# Patient Record
Sex: FEMALE | Race: WHITE | NOT HISPANIC OR LATINO | Employment: PART TIME | URBAN - METROPOLITAN AREA
[De-identification: names, ages, dates, MRNs, and addresses within clinical notes are randomized per-mention and may not be internally consistent; named-entity substitution may affect disease eponyms.]

---

## 2024-06-27 ENCOUNTER — APPOINTMENT (EMERGENCY)
Dept: RADIOLOGY | Facility: HOSPITAL | Age: 69
End: 2024-06-27
Payer: COMMERCIAL

## 2024-06-27 ENCOUNTER — HOSPITAL ENCOUNTER (EMERGENCY)
Facility: HOSPITAL | Age: 69
Discharge: HOME | End: 2024-06-27
Attending: EMERGENCY MEDICINE
Payer: COMMERCIAL

## 2024-06-27 VITALS
OXYGEN SATURATION: 98 % | TEMPERATURE: 98.7 F | DIASTOLIC BLOOD PRESSURE: 85 MMHG | SYSTOLIC BLOOD PRESSURE: 196 MMHG | HEART RATE: 86 BPM | RESPIRATION RATE: 18 BRPM

## 2024-06-27 DIAGNOSIS — S01.01XA LACERATION OF SCALP, INITIAL ENCOUNTER: ICD-10-CM

## 2024-06-27 DIAGNOSIS — M43.9 COMPRESSION DEFORMITY OF VERTEBRA: ICD-10-CM

## 2024-06-27 DIAGNOSIS — R91.1 PULMONARY NODULE: Primary | ICD-10-CM

## 2024-06-27 DIAGNOSIS — R03.0 ELEVATED BLOOD PRESSURE READING: ICD-10-CM
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
## 2024-06-27 DIAGNOSIS — S92.352A DISPLACED FRACTURE OF FIFTH METATARSAL BONE, LEFT FOOT, INITIAL ENCOUNTER FOR CLOSED FRACTURE: ICD-10-CM

## 2024-06-27 PROBLEM — S22.000A THORACIC COMPRESSION FRACTURE (HCC): Status: ACTIVE | Noted: 2024-06-27

## 2024-06-27 LAB
DME PARACHUTE DELIVERY DATE REQUESTED: NORMAL
DME PARACHUTE ITEM DESCRIPTION: NORMAL
DME PARACHUTE ORDER STATUS: NORMAL
DME PARACHUTE SUPPLIER NAME: NORMAL
DME PARACHUTE SUPPLIER PHONE: NORMAL

## 2024-06-27 PROCEDURE — 74176 CT ABD & PELVIS W/O CONTRAST: CPT

## 2024-06-27 PROCEDURE — 97167 OT EVAL HIGH COMPLEX 60 MIN: CPT

## 2024-06-27 PROCEDURE — NC001 PR NO CHARGE: Performed by: PODIATRIST

## 2024-06-27 PROCEDURE — 73130 X-RAY EXAM OF HAND: CPT

## 2024-06-27 PROCEDURE — 71250 CT THORAX DX C-: CPT

## 2024-06-27 PROCEDURE — 73630 X-RAY EXAM OF FOOT: CPT

## 2024-06-27 PROCEDURE — 97163 PT EVAL HIGH COMPLEX 45 MIN: CPT

## 2024-06-27 PROCEDURE — 70450 CT HEAD/BRAIN W/O DYE: CPT

## 2024-06-27 PROCEDURE — 72125 CT NECK SPINE W/O DYE: CPT

## 2024-06-27 PROCEDURE — 72080 X-RAY EXAM THORACOLMB 2/> VW: CPT

## 2024-06-27 PROCEDURE — NC001 PR NO CHARGE: Performed by: STUDENT IN AN ORGANIZED HEALTH CARE EDUCATION/TRAINING PROGRAM

## 2024-06-27 PROCEDURE — 99205 OFFICE O/P NEW HI 60 MIN: CPT | Performed by: STUDENT IN AN ORGANIZED HEALTH CARE EDUCATION/TRAINING PROGRAM

## 2024-06-27 PROCEDURE — NC001 PR NO CHARGE: Performed by: EMERGENCY MEDICINE

## 2024-06-27 PROCEDURE — 90715 TDAP VACCINE 7 YRS/> IM: CPT

## 2024-06-27 PROCEDURE — 90471 IMMUNIZATION ADMIN: CPT

## 2024-06-27 PROCEDURE — 12004 RPR S/N/AX/GEN/TRK7.6-12.5CM: CPT | Performed by: EMERGENCY MEDICINE

## 2024-06-27 PROCEDURE — 76705 ECHO EXAM OF ABDOMEN: CPT | Performed by: EMERGENCY MEDICINE

## 2024-06-27 PROCEDURE — 99285 EMERGENCY DEPT VISIT HI MDM: CPT | Performed by: EMERGENCY MEDICINE

## 2024-06-27 PROCEDURE — 99284 EMERGENCY DEPT VISIT MOD MDM: CPT

## 2024-06-27 PROCEDURE — 99214 OFFICE O/P EST MOD 30 MIN: CPT | Performed by: STUDENT IN AN ORGANIZED HEALTH CARE EDUCATION/TRAINING PROGRAM

## 2024-06-27 PROCEDURE — 76604 US EXAM CHEST: CPT | Performed by: EMERGENCY MEDICINE

## 2024-06-27 RX ORDER — LIDOCAINE HYDROCHLORIDE AND EPINEPHRINE 10; 10 MG/ML; UG/ML
10 INJECTION, SOLUTION INFILTRATION; PERINEURAL ONCE
Status: COMPLETED | OUTPATIENT
Start: 2024-06-27 | End: 2024-06-27

## 2024-06-27 RX ADMIN — LIDOCAINE HYDROCHLORIDE,EPINEPHRINE BITARTRATE 10 ML: 10; .01 INJECTION, SOLUTION INFILTRATION; PERINEURAL at 06:05

## 2024-06-27 RX ADMIN — TETANUS TOXOID, REDUCED DIPHTHERIA TOXOID AND ACELLULAR PERTUSSIS VACCINE, ADSORBED 0.5 ML: 5; 2.5; 8; 8; 2.5 SUSPENSION INTRAMUSCULAR at 06:05

## 2024-06-27 NOTE — CONSULTS
[facility]  Consult  Name: Melinda Hickman 68 y.o. female I MRN: [id number]  Unit/Bed#: ED 02 I Date of Admission: 2024   Date of Service: 2024 I Hospital Day: 0    Inpatient consult to Neurosurgery  Consult performed by: Anali Tafoya PA-C  Consult ordered by: Ashu Vargas MD        Assessment & Plan   * Thoracic compression fracture (HCC)  Assessment & Plan  Age indeterminate T5 and T7 compression fractures   Presented to the [facility] ER on  after a fall down 21 steps   + head strike, no LOC, was able to get up on her own   Denies any back pain, leg pain, weakness, N/T, trouble walking, urinary retention, BBI, etc.     Imagin/27 CT c/a/p: Moderate compression deformities of the T5 and T7 vertebral bodies of indeterminate age. Spinal degenerative changes.    CT c-spine: No cervical spine fracture or traumatic malalignment    CTH: No acute intracranial abnormality. Right parietal scalp laceration.    Plan:   Continue to closely monitor neuro exam   Frequent neuro checks per primary team   Maintain normotensive BP goals, MAP > 65   Recommend conservative management   TLSO brace to be worn PRN for comfort   Maintain thoracolumbar spinal precautions   No bending, twisting, lifting > 5-10lbs   Please obtain baseline upright xrays   Pain control per primary team   PT/OT   Encourage fall precautions   Recommend outpt workup with her PCP for workup and treatment of possible osteoporosis   DVT ppx: SCDs, okay for chem dvt ppx from a nsgy standpoint   Medical management per primary team   Social work following for assistance with dispo once medically cleared     Neurosurgery will follow from the periphery and review upright x-rays once completed. Please reach out with any further questions or concerns.              History of Present Illness   HPI: Melinda Hickman is a 68 y.o. year old female who presented to the [facility] ER early this a.m. after  a fall down approximately 21 steps.  Patient states she woke up early this morning to go to the bathroom and on her way back to bed she opened up the wrong door and fell down her stairs.  She admits to head strike and presents with evidence of head trauma.  Denies any LOC.  Patient states she was able to get up on her own without difficulty.  Imaging in the ER revealed age-indeterminate T5 and T7 compression fractures.  At this time, the patient denies any back pain, radicular pain, weakness, numbness, bowel/bladder incontinence, ambulatory dysfunction, frequent falls.  Patient really offers no complaints.  She admits to some mild left foot pain and a mild tenderness around her scalp abrasion/laceration.  Otherwise, patient is looking forward to when she can be discharged home this morning.    Review of Systems   Eyes:  Negative for photophobia and visual disturbance.   Respiratory:  Negative for shortness of breath.    Cardiovascular:  Negative for chest pain.   Gastrointestinal:  Negative for nausea.   Musculoskeletal:  Negative for back pain, gait problem, myalgias, neck pain and neck stiffness.   Skin:  Positive for rash and wound.   Neurological:  Positive for headaches. Negative for dizziness, tremors, seizures, syncope, facial asymmetry, speech difficulty, weakness, light-headedness and numbness.   Psychiatric/Behavioral:  Negative for agitation, behavioral problems, confusion and decreased concentration.        Historical Information   History reviewed. No pertinent past medical history.  History reviewed. No pertinent surgical history.  Social History     Substance and Sexual Activity   Alcohol Use None     Social History     Substance and Sexual Activity   Drug Use Not on file     Social History     Tobacco Use   Smoking Status Not on file   Smokeless Tobacco Not on file     History reviewed. No pertinent family history.    Meds/Allergies   all current active meds have been reviewed  No Known  Allergies    Objective   I/O       None          Physical Exam  Constitutional:       General: She is not in acute distress.     Appearance: Normal appearance. She is normal weight. She is not ill-appearing or toxic-appearing.   HENT:      Head: Normocephalic.      Comments: Noted right-sided scalp abrasion/laceration  Multiple forehead and nasal abrasions     Right Ear: External ear normal.      Left Ear: External ear normal.      Nose: Nose normal. No congestion or rhinorrhea.      Mouth/Throat:      Mouth: Mucous membranes are moist.   Eyes:      General: No scleral icterus.        Right eye: No discharge.         Left eye: No discharge.      Extraocular Movements: Extraocular movements intact.      Conjunctiva/sclera: Conjunctivae normal.      Pupils: Pupils are equal, round, and reactive to light.   Cardiovascular:      Rate and Rhythm: Normal rate.   Pulmonary:      Effort: Pulmonary effort is normal. No respiratory distress.      Comments: Respiratory distress on room air  Abdominal:      General: Abdomen is flat.   Musculoskeletal:         General: Tenderness and signs of injury present. No deformity. Normal range of motion.      Cervical back: Normal range of motion and neck supple. No rigidity or tenderness.      Right lower leg: No edema.      Left lower leg: No edema.      Comments: Tenderness and ecchymosis noted over the lateral dorsal aspect of the left foot   Skin:     General: Skin is warm and dry.      Capillary Refill: Capillary refill takes less than 2 seconds.   Neurological:      General: No focal deficit present.      Mental Status: She is alert and oriented to person, place, and time. Mental status is at baseline.      Cranial Nerves: No cranial nerve deficit.      Sensory: No sensory deficit.      Motor: No weakness.      Coordination: Coordination normal.      Comments: GCS 15   A&Ox3   Appropriately answering questions and following commands   No dysarthria or aphasia   No appreciated CN  "deficits   Strength 5/5 throughout to dary UE and dary LE   Sensation intact to light touch to dary UE and dary LE   No drift or ataxia appreciated dary      Psychiatric:         Mood and Affect: Mood normal.         Behavior: Behavior normal.         Thought Content: Thought content normal.         Judgment: Judgment normal.       Neurologic Exam     Mental Status   Oriented to person, place, and time.     Cranial Nerves     CN III, IV, VI   Pupils are equal, round, and reactive to light.      Vitals:Blood pressure (!) 196/85, pulse 86, temperature 98.7 °F (37.1 °C), resp. rate 18, SpO2 98%.,There is no height or weight on file to calculate BMI.     Lab Results:             Invalid input(s): \"LABALBU\"              No results found for: \"TROPONINT\"  ABG:No results found for: \"PHART\", \"FPH2UPZ\", \"PO2ART\", \"ELW7KNG\", \"C7CGCDND\", \"BEART\", \"SOURCE\"    Imaging Studies: I have personally reviewed pertinent reports.   and I have personally reviewed pertinent films in PACS  CT head without contrast    Result Date: 6/27/2024  Narrative: CT BRAIN - WITHOUT CONTRAST INDICATION:   Headstrike after mechnical fall down stairs. COMPARISON:  None. TECHNIQUE:  CT examination of the brain was performed.  Multiplanar 2D reformatted images were created from the source data. Radiation dose length product (DLP) for this visit:  895 mGy-cm .  This examination, like all CT scans performed in the [facility], was performed utilizing techniques to minimize radiation dose exposure, including the use of iterative reconstruction and automated exposure control. IMAGE QUALITY:  Diagnostic. FINDINGS: PARENCHYMA:  No intracranial mass, mass effect or midline shift. No CT signs of acute infarction.  No acute parenchymal hemorrhage. VENTRICLES AND EXTRA-AXIAL SPACES:  Ventricles and extra-axial CSF spaces are prominent commensurate with the degree of volume loss.  No hydrocephalus.  No acute extra-axial hemorrhage. VISUALIZED ORBITS: " Normal visualized orbits. 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 CALVARIUM AND EXTRACRANIAL SOFT TISSUES: Right parietal scalp laceration. No acute calvarial fracture.     Impression: No acute intracranial abnormality. Right parietal scalp laceration. 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 Workstation performed: [id number]     CT cervical spine without contrast    Result Date: 6/27/2024  Narrative: CT CERVICAL SPINE - WITHOUT CONTRAST INDICATION:   Fall down a flight of stairs. COMPARISON:  None. TECHNIQUE:  CT examination of the cervical spine was performed without intravenous contrast.  Contiguous axial images were obtained. Multiplanar 2D reformatted images were created from the source data. Radiation dose length product (DLP) for this visit:  725 mGy-cm .  This examination, like all CT scans performed in the [facility], was performed utilizing techniques to minimize radiation dose exposure, including the use of iterative reconstruction and automated exposure control. IMAGE QUALITY:  Diagnostic. FINDINGS: ALIGNMENT:  Normal alignment of the cervical spine. No subluxation. VERTEBRAE:  No fracture. DEGENERATIVE CHANGES:  Moderate multilevel cervical degenerative changes are noted. No critical central canal stenosis. PREVERTEBRAL AND PARASPINAL SOFT TISSUES: Unremarkable THORACIC INLET:  Normal.     Impression: No cervical spine fracture or traumatic malalignment. Workstation performed: [id number]     CT chest abdomen pelvis wo contrast    Result Date: 6/27/2024  Narrative: CT CHEST, ABDOMEN AND PELVIS WITHOUT IV CONTRAST INDICATION: Fall down a flight of stairs. COMPARISON: None. TECHNIQUE: CT examination of the chest, abdomen and pelvis was performed without intravenous contrast. Multiplanar 2D reformatted images were created from the source data. This examination, like  all CT scans performed in the [facility], was performed utilizing techniques to minimize radiation dose exposure, including the use of iterative reconstruction and automated exposure control. Radiation dose length product (DLP) for this visit: 410 mGy-cm Enteric Contrast: Not administered. FINDINGS: CHEST LUNGS: Scattered linear subsegmental atelectasis/scarring. No acute consolidative airspace disease. No tracheal or endobronchial lesion. A 3 mm right lower lobe solid pulmonary nodule (10/209). PLEURA: Unremarkable. HEART/GREAT VESSELS: The heart is not enlarged. Moderate coronary artery calcifications.. No thoracic aortic aneurysm. MEDIASTINUM AND MACY: Unremarkable. CHEST WALL AND LOWER NECK: Unremarkable. ABDOMEN LIVER/BILIARY TREE: Mild hepatomegaly measuring 18.5 cm in length. No suspicious mass. Normal hepatic contours. No biliary dilation. GALLBLADDER: No calcified gallstones. No pericholecystic inflammatory change. SPLEEN: Unremarkable. PANCREAS: Unremarkable. ADRENAL GLANDS: Low density lobulated thickening of bilateral adrenal glands, statistically likely due to small adenomas or adenomatous hyperplasia. KIDNEYS/URETERS: Left renal sinus cysts. No hydronephrosis. STOMACH AND BOWEL: Colonic diverticulosis without findings of acute diverticulitis. APPENDIX: Normal. ABDOMINOPELVIC CAVITY: No ascites. No pneumoperitoneum. No lymphadenopathy. VESSELS: Atherosclerosis without abdominal aortic aneurysm. PELVIS REPRODUCTIVE ORGANS: Unremarkable for patient's age. URINARY BLADDER: Unremarkable. ABDOMINAL WALL/INGUINAL REGIONS: Small fat-containing umbilical hernia. BONES: Moderate compression deformities of the T5 and T7 vertebral bodies of indeterminate age. Spinal degenerative changes.     Impression: Age indeterminant moderate compression deformities of the T5 and T7 vertebral bodies. Recommend clinical correlation with point tenderness. A 3 mm right lower lobe solid pulmonary nodule. Based on  current Fleischner Society 2017 Guidelines on incidental pulmonary nodule, no routine follow-up is needed if the patient is low risk. If the patient is high risk, optional follow-up chest CT at 12 months can be considered. The study was marked in EPIC for immediate notification. Workstation performed: [id number]     EKG, Pathology, and Other Studies: I have personally reviewed pertinent reports.   and I have personally reviewed pertinent films in PACS    VTE Prophylaxis: Sequential compression device (Venodyne) , okay for chem dvt ppx from a nsgy standpoint     Code Status: No Order  Advance Directive and Living Will:      Power of :    POLST:      Counseling / Coordination of Care  I spent 30 minutes with the patient.

## 2024-06-27 NOTE — ED PROVIDER NOTES
History  Chief Complaint   Patient presents with    Fall     Pt states she was walking down the steps and tripped which caused her to fell 21 steps. Pt thinks she has a laceration on the back of her head and has an abrasion on her face and nose. Pt denies being on blood thinners. Pt denies having a headache.      HPI  Patient is a 68 y.o. female who presents to the ED with  for evaluation of fall with headstrike    None       History reviewed. No pertinent past medical history.    History reviewed. No pertinent surgical history.    History reviewed. No pertinent family history.  I have reviewed and agree with the history as documented.    E-Cigarette/Vaping     E-Cigarette/Vaping Substances           Review of Systems    Physical Exam  ED Triage Vitals   Temperature Pulse Respirations Blood Pressure SpO2   06/27/24 0311 06/27/24 0311 06/27/24 0311 06/27/24 0311 06/27/24 0311   98.7 °F (37.1 °C) 83 22 (!) 177/91 98 %      Temp src Heart Rate Source Patient Position - Orthostatic VS BP Location FiO2 (%)   -- 06/27/24 0430 06/27/24 0311 06/27/24 0311 --    Monitor Sitting Right arm       Pain Score       06/27/24 0311       1             Orthostatic Vital Signs  Vitals:    06/27/24 0311 06/27/24 0430   BP: (!) 177/91 (!) 196/91   Pulse: 83 80   Patient Position - Orthostatic VS: Sitting Lying       Physical Exam    ED Medications  Medications   tetanus-diphtheria-acellular pertussis (BOOSTRIX) IM injection 0.5 mL (has no administration in time range)       Diagnostic Studies  Results Reviewed       None                   CT head without contrast    (Results Pending)   CT chest abdomen pelvis wo contrast    (Results Pending)   CT cervical spine without contrast    (Results Pending)   XR foot 3+ views LEFT    (Results Pending)   XR hand 3+ views RIGHT    (Results Pending)         Procedures  POC FAST US    Date/Time: 6/27/2024 5:56 AM    Performed by: Melinda Truong DO  Authorized by: Melinda Govea  DO Alexi    Patient location:  ED  Procedure details:     Exam Type:  Diagnostic    Indications: blunt abdominal trauma      Assess for:  Hemothorax, intra-abdominal fluid, pericardial effusion and pneumothorax    Technique: extended FAST      Views obtained:  Heart - Pericardial sac, Left thorax, LUQ - Splenorenal space, RUQ - Molina's Pouch, Suprapubic - Pouch of Mandeep and Right thorax    Image quality: diagnostic      Image availability:  Images available in PACS  FAST Findings:     RUQ (Hepatorenal) free fluid: absent      LUQ (Splenorenal) free fluid: absent      Suprapubic free fluid: absent      Cardiac wall motion: identified      Pericardial effusion: absent    extended FAST (Pulmonary) findings:     Left lung sliding: Present      Right lung sliding: Present    Interpretation:     Impressions: negative          ED Course                                       Medical Decision Making  Amount and/or Complexity of Data Reviewed  Radiology: ordered.    Risk  Prescription drug management.          Disposition  Final diagnoses:   None     ED Disposition       None          Follow-up Information    None         Patient's Medications    No medications on file     No discharge procedures on file.    PDMP Review       None             ED Provider  Attending physically available and evaluated Melinda Woods I managed the patient along with the ED Attending.    Electronically Signed by         sensory deficit.      Motor: No weakness.      Coordination: Coordination normal.      Gait: Gait normal.   Psychiatric:         Mood and Affect: Mood normal.         Behavior: Behavior normal.         ED Medications  Medications   tetanus-diphtheria-acellular pertussis (BOOSTRIX) IM injection 0.5 mL (0.5 mL Intramuscular Given 6/27/24 0605)   lidocaine-epinephrine (XYLOCAINE/EPINEPHRINE) 1 %-1:100,000 injection 10 mL (10 mL Infiltration Given by Other 6/27/24 0605)       Diagnostic Studies  Results Reviewed       None                   XR spine thoracolumbar 2 vw   Final Result by Mukesh Marlow MD (06/27 7559)      T5 and T7 vertebral body compression deformities, better characterized on same-day CT chest abdomen pelvis.      Question cortical protrusion posteriorly versus artifact at T5.         The study was marked in EPIC for immediate notification.      Resident: Akshat Maddox I, the attending radiologist, have reviewed the images and agree with the final report above.      Workstation performed: [id number]         XR foot 3+ vw left   Final Result by Mukesh Marlow MD (06/27 3642)      Unchanged fifth metatarsal shaft and fourth metatarsal neck fractures.      I have personally reviewed this study including all images.  / R.J.F.         Computerized Assisted Algorithm (CAA) may have been used to analyze all applicable images.      Resident: Akshat Maddox I, the attending radiologist, have reviewed the images and agree with the final report above.      Workstation performed: [id number]         XR hand 3+ views RIGHT   Final Result by Mukesh Marlow MD (06/27 6791)      No acute osseous abnormality.      If symptoms persist, a follow up exam is suggested in 7-14 days.         Computerized Assisted Algorithm (CAA) may have been used to analyze all applicable images.      Workstation performed: [id number]         CT head without contrast   Final Result by Regina Carrero MD (06/27  0620)      No acute intracranial abnormality.      Right parietal scalp laceration.      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      Workstation performed: [id number]         CT chest abdomen pelvis wo contrast   Final Result by Regina Carrero MD (06/27 0619)      Age indeterminant moderate compression deformities of the T5 and T7 vertebral bodies. Recommend clinical correlation with point tenderness.      A 3 mm right lower lobe solid pulmonary nodule. Based on current Fleischner Society 2017 Guidelines on incidental pulmonary nodule, no routine follow-up is needed if the patient is low risk. If the patient is high risk, optional follow-up chest CT at 12    months can be considered.      The study was marked in EPIC for immediate notification.      Workstation performed: [id number]         CT cervical spine without contrast   Final Result by Regina Carrero MD (06/27 0619)      No cervical spine fracture or traumatic malalignment.                  Workstation performed: [id number]         XR foot 3+ views LEFT   Final Result by Mukesh Marlow MD (06/27 0983)      Acute fractures of the distal fourth and fifth metatarsals.      Fracture of the fifth metatarsal is noted in the ER clinical compression.      This interpretation includes significant discrepancy from the referring clinician's preliminary interpretation and will be communicated shortly.      The study was marked in EPIC for immediate notification.         Computerized Assisted Algorithm (CAA) may have been used to analyze all applicable images.      Workstation performed: [id number]               Procedures  POC FAST US    Date/Time: 6/27/2024 5:56 AM    Performed by: Melinda Truong DO  Authorized by: Melinda Truong DO    Patient location:  ED  Procedure details:     Exam Type:  Diagnostic    Indications: blunt abdominal trauma      Assess for:  Hemothorax, intra-abdominal fluid,  pericardial effusion and pneumothorax    Technique: extended FAST      Views obtained:  Heart - Pericardial sac, Left thorax, LUQ - Splenorenal space, RUQ - Molina's Pouch, Suprapubic - Pouch of Mandeep and Right thorax    Image quality: diagnostic      Image availability:  Images available in PACS  FAST Findings:     RUQ (Hepatorenal) free fluid: absent      LUQ (Splenorenal) free fluid: absent      Suprapubic free fluid: absent      Cardiac wall motion: identified      Pericardial effusion: absent    extended FAST (Pulmonary) findings:     Left lung sliding: Present      Right lung sliding: Present    Interpretation:     Impressions: negative    Universal Protocol:  Consent: Verbal consent obtained.  Risks and benefits: risks, benefits and alternatives were discussed  Consent given by: patient  Patient understanding: patient states understanding of the procedure being performed  Patient consent: the patient's understanding of the procedure matches consent given  Procedure consent: procedure consent matches procedure scheduled  Relevant documents: relevant documents present and verified  Test results: test results available and properly labeled  Site marked: the operative site was marked  Radiology Images displayed and confirmed. If images not available, report reviewed: imaging studies available  Required items: required blood products, implants, devices, and special equipment available  Patient identity confirmed: verbally with patient and arm band  Laceration repair    Date/Time: 6/27/2024 6:49 AM    Performed by: Melinda Truong DO  Authorized by: Melinda Truong DO  Body area: head/neck  Location details: scalp  Laceration length: 9 cm  Foreign bodies: no foreign bodies  Tendon involvement: none  Nerve involvement: none  Vascular damage: no  Anesthesia: local infiltration    Anesthesia:  Local Anesthetic: lidocaine 1% with epinephrine  Anesthetic total: 6 mL      Procedure  Details:  Preparation: Patient was prepped and draped in the usual sterile fashion.  Irrigation solution: saline  Irrigation method: syringe  Amount of cleaning: extensive (1.5 L irrigation)  Skin closure: staples (Total staples: 11)  Approximation: loose  Approximation difficulty: complex  Patient tolerance: patient tolerated the procedure well with no immediate complications            ED Course  ED Course as of 07/04/24 0559   u Jun 27, 2024   0627 CT chest abdomen pelvis wo contrast  Age indeterminant moderate compression deformities of the T5 and T7 vertebral bodies. A 3 mm right lower lobe solid pulmonary nodule.   0628 CT cervical spine without contrast  No cervical spine fracture or traumatic malalignment.   0628 CT head without contrast  No acute intracranial abnormality.     Right parietal scalp laceration.     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   0649 11 staples, washout 1.5L w syringe                                       Medical Decision Making  ASSESSMENT: Patient is a 68 y.o. female who presents with head injury/scalp laceration, left foot pain, right wrist pain s/p mechanical fall down 21 steps.   DDX includes but not limited to: Scalp laceration, intracranial hemorrhage, cervical spine injury, intra-abdominal injury, left foot fracture, right wrist fracture.   PLAN: CTH, CT c-spine, CT cap, xr left foot. POC FAST. Update Tdap. Declining treatment with pain medication at this time.    Patient care signed out to oncoming resident pending podiatry consult for left metatarsal fractures and neurosurgery for vertebral fractures, likely discharge home with outpatient follow up.    Amount and/or Complexity of Data Reviewed  Radiology: ordered. Decision-making details documented in ED Course.    Risk  Prescription drug management.          Disposition  Final diagnoses:   Pulmonary nodule   Fall, initial encounter   Compression deformity of vertebra    Laceration of scalp, initial encounter   Displaced fracture of fifth metatarsal bone, left foot, initial encounter for closed fracture   Elevated blood pressure reading     Time reflects when diagnosis was documented in both MDM as applicable and the Disposition within this note       Time User Action Codes Description Comment    6/27/2024  6:35 AM Check, Ashu Tomasz [R91.1] Pulmonary nodule     6/27/2024  6:35 AM Check, Ashu Add [W19.XXXA] Fall, initial encounter     6/27/2024  6:36 AM Check, Ashu Add [M43.9] Compression deformity of vertebra     6/27/2024  6:36 AM Check, Ashu Add [S01.01XA] Laceration of scalp, initial encounter     6/27/2024  7:09 AM Melinda Jesus [S92.352A] Displaced fracture of fifth metatarsal bone, left foot, initial encounter for closed fracture     6/27/2024  7:30 AM Melinda Jesus [R03.0] Elevated blood pressure reading           ED Disposition       ED Disposition   Discharge    Condition   Stable    Date/Time   u Jun 27, 2024 12:35 PM    Comment   Melinda Hickman discharge to home/self care.                   Follow-up Information       Follow up With Specialties Details Why Contact Info Additional Information    Ty Villanueva DPM Podiatry, Wound Care Schedule an appointment as soon as possible for a visit in 1 week(s) MAKE APPOINTMENT WITHIN ONE WEEK OF DISCHARGE [address] [facility] [id number] [phone]       [facility] Neurosurgical [facility] Neurosurgery Call   [address] [phone] [facility], [address] [place] [address], [phone]    [facility] Emergency Department Emergency Medicine Go to  If symptoms worsen [address] [facility] [address] [phone] [facility] Emergency Department, [address] [place], [place], [address] [phone]     [facility]  Call   [phone]               There are no discharge medications for this patient.        PDMP Review       None             ED Provider  Attending physically available and evaluated Melinda Hickman. I managed the patient along with the ED Attending.    Electronically Signed by           Melinda Truong DO  07/05/24 9790

## 2024-06-27 NOTE — OCCUPATIONAL THERAPY NOTE
Occupational Therapy Evaluation     Patient Name: Melinda Hickman  Today's Date: 6/27/2024  Problem List  Principal Problem:    Thoracic compression fracture (HCC)    Past Medical History  History reviewed. No pertinent past medical history.  Past Surgical History  History reviewed. No pertinent surgical history.        06/27/24 1127   OT Last Visit   OT Visit Date 06/27/24   Note Type   Note type Evaluation   Pain Assessment   Pain Assessment Tool 0-10   Pain Score No Pain   Restrictions/Precautions   Weight Bearing Precautions Per Order Yes   LLE Weight Bearing Per Order NWB   Braces or Orthoses Other (Comment);CAM Boot;TLSO  (TLSO for comfort, CAM boot LLE although not donned during session as CAM boot was too small for pt, podiatry aware and cleared pt for mobility without CAM boot on NWB LLE;)   Other Precautions WBS;Spinal precautions (NWB LLE)   Home Living   Type of Home House   Home Layout Two level;Bed/bath upstairs  (able to stay on 1st fl if needed, 0 EARLE)   Bathroom Shower/Tub Walk-in shower   Bathroom Toilet Standard   Bathroom Equipment Grab bars in shower;Built-in shower seat   Bathroom Accessibility Accessible   Home Equipment Cane  (does not use pta)   Additional Comments Pt lives in a 2 SH with 0 EARLE, 19 steps to 2nd fl bed/bath with walk-in shower with seat and GB, standard toilet   Prior Function   Level of [place] Independent with ADLs;Independent with functional mobility;Independent with IADLS   Lives With Significant other   Receives Help From Family   IADLs Independent with meal prep;Independent with medication management;Family/Friend/Other provides transportation   Falls in the last 6 months 1 to 4  (1 leading to current admission)   Vocational Part time employment   Lifestyle   Autonomy Pta pt I with ADL, IADL and functional mobility, (-)    Reciprocal Relationships supportive significant other   Service to Others works for [employer]  "Gratification enjoys reading and gardening   Subjective   Subjective \"I really don't have any pain\"   ADL   Where Assessed Edge of bed   Eating Assistance 6  Modified independent   Grooming Assistance 6  Modified Independent   UB Bathing Assistance 5  Supervision/Setup   LB Bathing Assistance 4  Minimal Assistance   UB Dressing Assistance 6  Modified independent   LB Dressing Assistance 4  Minimal Assistance   Toileting Assistance  5  Supervision/Setup   Functional Assistance 4  Minimal Assistance   Additional Comments Pt has adequate assist at home from significant other.   Bed Mobility   Supine to Sit 6  Modified independent   Sit to Supine 6  Modified independent   Additional Comments Pt greeted and left in bed with all needs within reach.   Transfers   Sit to Stand 5  Supervision   Additional items Verbal cues   Stand to Sit 5  Supervision   Additional items Verbal cues   Additional Comments with RW   Functional Mobility   Functional Mobility 4  Minimal assistance   Additional Comments Pt performs short distances in room with MIN A x 1 with RW, maintains NWB LLE.   Additional items Rolling walker   Balance   Static Sitting Good   Dynamic Sitting Fair +   Static Standing Fair   Dynamic Standing Fair -   Ambulatory Poor +   Activity Tolerance   Activity Tolerance Patient tolerated treatment well   Medical Staff Made Aware Co-eval with DPT due to medical complexity   Nurse Made Aware RN cleared for therapy, spoke with restorative and podiatry regarding bracing   RUE Assessment   RUE Assessment WFL   LUE Assessment   LUE Assessment WFL   Hand Function   Gross Motor Coordination Functional   Fine Motor Coordination Functional   Sensation   Light Touch No apparent deficits   Cognition   Overall Cognitive Status WFL   Arousal/Participation Alert;Cooperative   Attention Within functional limits   Orientation Level Oriented X4   Memory Within functional limits   Following Commands Follows all commands and directions " without difficulty   Comments Pt cooperative to therapy, with good understanding of precautions, NWB LLE and TLSO donning/doffing.   Assessment   Prognosis Good   Assessment Pt is a 68 y.o. female who was admitted to [facility] on 6/27/2024 after fall down stairs at home with Thoracic compression fracture (HCC), L 5th metatarsal shaft fracture, L 4th metatarsal neck fracture, now s/p closed reduction of L foot fracture at bedside. Pt seen for an OT evaluation per active OT orders, NWB LLE and TLSO brace for comfort.  Pt with PMH including thoracic compression fracture. Pt lives in a [facility] with 0 EARLE, uses a 2nd fl bed/bath with walk-in shower with seat and GB, standard toilet. Pta, pt was independent w/ ADL/IADL and functional mobility, was (-) driving and was not using any DME at baseline. Currently, pt is Mod I for UB ADL, Min Ax1 for LB ADL, and completed transfers/FM w Supervision-Min Ax1.   The patient's raw score on the AM-PAC Daily Activity Inpatient Short Form is 21. A raw score of greater than or equal to 19 suggests the patient may benefit from discharge to home. Please refer to the recommendation of the Occupational Therapist for safe discharge planning. Pt main limitations include NWB LLE although pt has adequate assist at home to assist as needed, indicating no further acute OT needs at this time, d/c acute OT. Please re-consult if pt has a change in functional status. From OT standpoint, recommend home with increased social support, level III services and a commode upon D/C. Pt was left supine in bed with all needs within reach.   Goals   Patient Goals to go home   Plan   OT Frequency Eval only   Discharge Recommendation   Rehab Resource Intensity Level, OT III (Minimum Resource Intensity)   Equipment Recommended Bedside commode   Commode Type Standard   AM-PAC Daily Activity Inpatient   Lower Body Dressing 3   Bathing 3   Toileting 3   Upper Body Dressing 4   Grooming 4   Eating 4   Daily  Activity Raw Score 21   Daily Activity Standardized Score (Calc for Raw Score >=11) 44.27   AM-PAC Applied Cognition Inpatient   Following a Speech/Presentation 4   Understanding Ordinary Conversation 4   Taking Medications 4   Remembering Where Things Are Placed or Put Away 4   Remembering List of 4-5 Errands 4   Taking Care of Complicated Tasks 4   Applied Cognition Raw Score 24   Applied Cognition Standardized Score 62.21   End of Consult   Education Provided Yes   Patient Position at End of Consult Supine;All needs within reach   Nurse Communication Nurse aware of consult         LAYA Connors, OTR/L

## 2024-06-27 NOTE — DISCHARGE INSTR - AVS FIRST PAGE
Discharge Instructions - Podiatry    Weight Bearing Status: NON weight bearing to left lower extremity                       Pain: Continue analgesics as directed    Follow-up appointment instructions: Please make an appointment within one week of discharge with Dr. Villanueva. Contact sooner if any increase in pain, or signs of infection occur    Patient Wound Care Instructions: Leave dressings clean, dry, and intact until 1st outpatient office visit.

## 2024-06-27 NOTE — RESTORATIVE TECHNICIAN NOTE
Restorative Technician Note      Patient Name: Melinda Hickman     Restorative Tech Visit Date: 06/27/24  Note Type: Bracing, Initial consult  Patient Position Upon Consult: Supine  Brace Applied: [place] Backpack TLSO (size large)  Additional Brace Ordered: No  Patient Position When Brace Applied: Seated  Education Provided: Yes  Patient Position at End of Consult: Other (comment) (long sittinon the stretcher)  Nurse Communication: Nurse aware of consult, application of brace    Handout left with the patient    Please contact [facility] PT Restorative tech on Epic Secure Chat  in regards to bracing instruction and/or adjustment.     CFo Raymond

## 2024-06-27 NOTE — CONSULTS
Podiatry - Consultation    Patient Information:   Melinda Hickman 68 y.o. female MRN: [id number]  Unit/Bed#: ED 02 Encounter: [id number]  PCP: No primary care provider on file.  Date of Admission:  6/27/2024  Date of Consultation: 06/27/24  Requesting Physician: No att. providers found      ASSESSMENT:    Melinda Hickman is a 68 y.o. female with:    L 5th metatarsal shaft fracture, closed  L 4th metatarsal neck fracture, closed  Fall    PLAN:    Pre-reduction XR of left foot reviewed. Per personal read, there is an oblique fracture of distal portion of 5th metatarsal, displaced. 4th metatarsal neck fracture noted.   Closed reduction of left foot fracture at bedside, see procedure note below.   Post-reduction XR of left foot reviewed. Per personal read, 5th metatarsal fracture was brought out to length during the reduction, however was not maintained post-reduction.  As left foot is NV intact with no tenting and no visible gross deformity, patient is stable for discharge from podiatry perspective. Patient should remain NWB to left foot (using patient's choice of crutches, walker, knee scooter). PT/OT medardo appreciated. Relayed importance of following up with podiatry outpatient within 1 week of discharge as she may likely need surgery. She expresses understanding.   Elevation and offloading on green foam wedges or pillows when non-ambulatory.  Rest of care per primary team.  Will discuss this plan with my attending and update as needed.    Procedure: Closed reduction: After verbal consent obtained, attention directed to left foot. Fracture was distracted, deformity re-created, and then attempted to reduce to anatomic position. During reduction, XR showed that the metatarsal was brought out to length, however, fracture was not able to be fully reduced. Smith compression dressing was applied while holding the metatarsal out to length. Upon completion of dressing, post-reduction XR was taken and noted to have  lost the correction. Patient tolerated the procedure well with no immediate complications.     Weightbearing status: Non-weightbearing left  foot in CAM boot    SUBJECTIVE:    History of Present Illness:    Melinda Hickman is a 68 y.o. female who presents to ED on 6/27/2024 due to fall.   We are consulted for left foot trauma. She had fallen early this morning down approximately 21 steps. She states that she had gone to her bathroom, and on the way back, she opened the wrong door and fell down the stairs. Admits to head strike with visible head trauma. Denies LOC. Neurosurgery has seen her already, diagnosing her with age-indeterminate T5 and T7 compression fx. Patient reports that her left foot had mild occasional pain since the fall, however, little to no pain for the most part. She states that it has been swollen since then. She denies any other pedal complaints.     Review of Systems:    Constitutional: Negative.    HENT: Negative.    Eyes: Negative.    Respiratory: Negative.    Cardiovascular: Negative.    Gastrointestinal: Negative.    Musculoskeletal: s/p fall   Skin: swelling to left foot. Abrasions to face.   Neurological: negative   Psych: Negative.     Past Medical and Surgical History:     History reviewed. No pertinent past medical history.    History reviewed. No pertinent surgical history.    Meds/Allergies:    Not in a hospital admission.    No Known Allergies    Social History:     Marital Status: Single    Substance Use History:   Social History     Substance and Sexual Activity   Alcohol Use None     Social History     Tobacco Use   Smoking Status Not on file   Smokeless Tobacco Not on file     Social History     Substance and Sexual Activity   Drug Use Not on file       Family History:    History reviewed. No pertinent family history.      OBJECTIVE:    Vitals:   Blood Pressure: (!) 196/85 (06/27/24 0609)  Pulse: 86 (06/27/24 0609)  Temperature: 98.7 °F (37.1 °C) (06/27/24  "0311)  Respirations: 18 (06/27/24 0609)  SpO2: 98 % (06/27/24 0609)    Physical Exam:    General Appearance: Alert, cooperative, no distress.  HEENT: Head normocephalic, atraumatic, without obvious abnormality.  Heart: Normal rate and rhythm.  Lungs: Non-labored breathing. No respiratory distress.  Abdomen: Without distension.  Psychiatric: AAOx3  Lower Extremity:    Vascular:   DP: Right: 2+ Left: 2+  PT: Right: 2+ Left: 2+  CRT < 3 seconds at the digits. +2/4 edema noted to left foot.   Pedal hair is absent.   Skin temperature is WNL bilaterally.    Musculoskeletal:  MMT is 5/5 in all muscle compartments on right. 3+/5 to left secondary to swelling.   ROM at the 1st MPJ and ankle joint are reduced bilaterally with the leg extended.   Mild pain on palpation of left foot along fracture site.   No gross deformities noted.     Dermatological:  Right foot: no open wounds, no bleeding, no bruising.     Left foot: Edematous throughout forefoot and midfoot. Ecchymosis along digits. No open wounds, lacerations, abrasions. No tenting of the skin/soft tissue. No fracture blisters/hematomas.     Neurological:  Gross sensation is intact.   Light touch is intact.   Protective sensation is intact.      Additional data:     Lab Results: I have personally reviewed pertinent labs including:              Invalid input(s): \"LABALBU\"        Cultures: I have personally reviewed pertinent cultures including:              Imaging: I have personally reviewed pertinent reports in PACS.  EKG, Pathology, and Other Studies: I have personally reviewed pertinent reports.    Time Spent for Care: 30 minutes.  More than 50% of total time spent on counseling and coordination of care as described above.      ** Please Note: Portions of the record may have been created with voice recognition software. Occasional wrong word or \"sound a like\" substitutions may have occurred due to the inherent limitations of voice recognition software. Read the chart " carefully and recognize, using context, where substitutions have occurred. **

## 2024-06-27 NOTE — Clinical Note
Melinda Hickman was seen and treated in our emergency department on 6/27/2024.        Other - See Comments    Patient will be NON-WEIGHT BEARING to the left foot until cleared by outpatient physician.    Diagnosis: Left Fourth and Fifth Metatarsal Fractures of the Left Foot    Melinda  is off the rest of the shift today.    She may return on this date:          If you have any questions or concerns, please don't hesitate to call.      Melinda Truong, DO    ______________________________           _______________          _______________  Hospital Representative                              Date                                Time

## 2024-06-27 NOTE — PLAN OF CARE
Problem: PHYSICAL THERAPY ADULT  Goal: Performs mobility at highest level of function for planned discharge setting.  See evaluation for individualized goals.  Description: Treatment/Interventions: Functional transfer training, LE strengthening/ROM, Elevations, Therapeutic exercise, Endurance training, Equipment eval/education, Gait training, Spoke to nursing, OT (communicated w/ CM via secure chat)  Equipment Recommended: Walker, Other (Comment) (BSC; 1st floor set-up at least initially upon D/C)       See flowsheet documentation for full assessment, interventions and recommendations.  Note: Prognosis: Good  Problem List: Decreased strength, Decreased endurance, Impaired balance, Decreased mobility, Orthopedic restrictions  Assessment: Pt is 68 y.o. female admitted with hx of fall down steps at home and Dx of Thoracic compression fracture, L 5th metatarsal shaft fracture, closed L 4th metatarsal neck fracture, closed and head laceration. Pt 's personal factors affecting POC include: steps in the house, working and ? level of (A) available at home. Pt's clinical presentation is currently  unstable/unpredictable which is evident in postop guarding, ortho restrictions and tolerance to only 16 feet of ambulation. Pt presents w/ min overall weakness, decreased functional endurance and inconsistent amb balance and gait patterns w/ close guarding requiring for amb w/ rw. Will cont to follow pt in PT for progressive mobilization to max level of (I), endurance, and safety. D/C recommendations are outlined below. Will cont to follow until then.        Rehab Resource Intensity Level, PT: III (Minimum Resource Intensity)    See flowsheet documentation for full assessment.

## 2024-06-27 NOTE — ASSESSMENT & PLAN NOTE
Age indeterminate T5 and T7 compression fractures   Presented to the [facility] ER on  after a fall down 21 steps   + head strike, no LOC, was able to get up on her own   Denies any back pain, leg pain, weakness, N/T, trouble walking, urinary retention, BBI, etc.     Imagin/27 CT c/a/p: Moderate compression deformities of the T5 and T7 vertebral bodies of indeterminate age. Spinal degenerative changes.    CT c-spine: No cervical spine fracture or traumatic malalignment    CTH: No acute intracranial abnormality. Right parietal scalp laceration.    Plan:   Continue to closely monitor neuro exam   Frequent neuro checks per primary team   Maintain normotensive BP goals, MAP > 65   Recommend conservative management   TLSO brace to be worn PRN for comfort   Maintain thoracolumbar spinal precautions   No bending, twisting, lifting > 5-10lbs   Please obtain baseline upright xrays   Pain control per primary team   PT/OT   Encourage fall precautions   Recommend outpt workup with her PCP for workup and treatment of possible osteoporosis   DVT ppx: eli Mosquera for chem dvt ppx from a nsgy standpoint   Medical management per primary team   Social work following for assistance with dispo once medically cleared     Neurosurgery will follow from the periphery and review upright x-rays once completed. Please reach out with any further questions or concerns.

## 2024-06-27 NOTE — ED CARE HANDOFF
Emergency Department Sign Out Note        Sign out and transfer of care from Dr. Jesus. See Separate Emergency Department note.     The patient, Melinda Hickman, was evaluated by the previous provider for fall.    Workup Completed:  Labs imaging lac repair    ED Course / Workup Pending (followup):  Nsgy and podiatry consults                                  ED Course as of 06/27/24 1557   Thu Jun 27, 2024   0657 SO: active - fall 5th and 4th metatarsal fracture. Scalp lac. Nsgy and Podiatry will see in ED - hopefull d/c after evals. Walking boot   0807 Pt declining L shoulder xray at this time believes her pain is muscular. Let her know that if it worsens to let us know as we can xray at that time. No bony tenderness to palpation    1002 4th and 5th metatarsal fx per rads   1002 Podiatry at bedside       Procedures  Medical Decision Making  Pt seen and evaluated in the ED by nsgy, podiatry, pt/ot and case management. Upright films obtained nsgy to f/u outpt. Podiatry reduced and splinted fracture recommending nwb with outpt f/u in x1 week. Pt/ot recommending walker and commode. All supplies obtained for pt. D/w pt about f/u and return precautions. Pt verbalized understanding. HD stable at time of d/c     Amount and/or Complexity of Data Reviewed  Radiology: ordered.    Risk  Prescription drug management.            Disposition  Final diagnoses:   Pulmonary nodule   Fall, initial encounter   Compression deformity of vertebra   Laceration of scalp, initial encounter   Displaced fracture of fifth metatarsal bone, left foot, initial encounter for closed fracture   Elevated blood pressure reading     Time reflects when diagnosis was documented in both MDM as applicable and the Disposition within this note       Time User Action Codes Description Comment    6/27/2024  6:35 AM Ashu Vargas Add [R91.1] Pulmonary nodule     6/27/2024  6:35 AM Ashu Vargas Add [W19.XXXA] Fall, initial encounter     6/27/2024  6:36 AM  CheckAshu Add [M43.9] Compression deformity of vertebra     6/27/2024  6:36 AM Ashu Vargas Add [S01.01XA] Laceration of scalp, initial encounter     6/27/2024  7:09 AM Melinda Jesus [S92.352A] Displaced fracture of fifth metatarsal bone, left foot, initial encounter for closed fracture     6/27/2024  7:30 AM Melinda Jesus [R03.0] Elevated blood pressure reading           ED Disposition       ED Disposition   Discharge    Condition   Stable    Date/Time   Thu Jun 27, 2024 12:35 PM    Comment   Melinda Hickman discharge to home/self care.                   Follow-up Information       Follow up With Specialties Details Why Contact Info Additional Information    Ty Villanueva DPM Podiatry, Wound Care Schedule an appointment as soon as possible for a visit in 1 week(s) MAKE APPOINTMENT WITHIN ONE WEEK OF DISCHARGE [address] [place] [id number]  [phone]       [facility] Neurosurgery Call   [address] [address] [phone] [facility], [address] [place] [address], [phone]    [facility] Emergency Department Emergency Medicine Go to  If symptoms worsen [address] [facility] [address]  [phone] [facility] Emergency Department, [address] [place], [place], [id number] [phone]    [facility]  Call   [phone]             There are no discharge medications for this patient.           ED Provider  Electronically Signed by     Jason Souza DO  06/27/24 7155

## 2024-06-27 NOTE — PROGRESS NOTES
Upright thoracic x-rays completed and reviewed for evaluation of T5 and T7 age-indeterminate compression fractures.    Imagin/27 upright thoracolumbar x-rays: pending final radiology read   Per my review, noted mild compression deformities at T5 and T7.  No significant retropulsion, distraction, obvious instability noted.  Overall alignment is well-maintained with some mild chronic kyphosis.    Plan:   Continue to closely monitor neuro exam   Continue plan for conservative management as previously noted in initial consult note completed earlier today  TLSO brace to be worn PRN comfort   Pain control per primary team  PT/OT  DVT ppx: eli Mosquera for chem dvt ppx from a nsgy standpoint   Medical management per primary team   Social work following for assistance with dispo once medically cleared     Neurosurgery will.  No further follow-up is required in this regard in the outpatient setting/nsgy office.  Our office contact information was provided and patient is encouraged to call as needed. Please reach out with any further questions or concerns.     
reading glasses

## 2024-06-27 NOTE — CASE MANAGEMENT
Case Management Discharge Planning Note    Patient name Melinda Hickman  Location ED 02/ED 02 MRN [id number]  : 1955 Date 2024       Current Admission Date: 2024  Current Admission Diagnosis:Thoracic compression fracture (HCC)   Patient Active Problem List    Diagnosis Date Noted Date Diagnosed    Thoracic compression fracture (HCC) 2024       LOS (days): 0  Geometric Mean LOS (GMLOS) (days):   Days to GMLOS:     OBJECTIVE:            Current admission status: Emergency   Preferred Pharmacy:   [id number] - [place], [place] - [address] [place] [address]  Phone: [phone] Fax: [phone]    Primary Care Provider: No primary care provider on file.    Primary Insurance: [employer]  Secondary Insurance:     DISCHARGE DETAILS:    Discharge planning discussed with:: Pt at bedside  [place] of Choice: Yes     CM contacted family/caregiver?: No- see comments (Pt is alert and oriented.)    DME Referral Provided  Referral made for DME?: Yes  DME referral completed for the following items:: Bedside Commode, Walker  DME Supplier Name:: [employer]    Other Referral/Resources/Interventions Provided:  Interventions: DME    Treatment Team Recommendation: Home  Discharge Destination Plan:: Home  Transport at Discharge : Family    Additional Comments: CM met with pt to discuss d/c plan and DME recommendations.  Pt is agreeable to going home with a walker and bedside commode.  Per pt, there is not a bathroom on the first level of her home.  CM ordered pt's bedside commode via [employer] and delivered it to bedside.    CM informed by PT that they rec'd home PT/OT.  CM met with pt and pt refused home PT/OT services.  CM updated PT and provider.

## 2024-06-27 NOTE — PHYSICAL THERAPY NOTE
Physical Therapy Evaluation     Patient's Name: Melinda Hickman    Admitting Diagnosis  Unspecified multiple injuries, initial encounter [T07.XXXA]    Problem List  Patient Active Problem List   Diagnosis    Thoracic compression fracture (HCC)       Past Medical History  History reviewed. No pertinent past medical history.    Past Surgical History  History reviewed. No pertinent surgical history.       06/27/24 1126   PT Last Visit   PT Visit Date 06/27/24   Note Type   Note type Evaluation   Pain Assessment   Pain Assessment Tool 0-10   Pain Score No Pain   Restrictions/Precautions   LLE Weight Bearing Per Order NWB   Braces or Orthoses TLSO;CAM Boot  (pt's size cam boot currently does not seem to fit over bulky dressing on (L) foot; nsg aware and contacted podiatry who cleared the pt for amb w/o cam boot on (while NWB on (L) LE); pt wore her own shoe (R) foot)   Other Precautions WBS;Spinal precautions   Home Living   Type of Home House   Home Layout Two level  (no EARLE; 19 steps up w/ hand rail)   Home Equipment Cane   Prior Function   Level of Crisp Independent with functional mobility  (amb w/o AD)   Lives With Significant other  (? if able to (A))   Falls in the last 6 months 1 to 4  (current fall)   Vocational Part time employment   General   Additional Pertinent History cleared for assessment/mobilization (spoke to nsg)   Cognition   Overall Cognitive Status WFL   Arousal/Participation Alert   Orientation Level Oriented to person;Oriented to place;Oriented to situation   Memory Within functional limits   Following Commands Follows one step commands without difficulty   Subjective   Subjective Alert; in bed; agreeable to demonstrate mobility   RUE Assessment   RUE Assessment WFL  (AROM)   LUE Assessment   LUE Assessment WFL  (AROM)   RLE Assessment   RLE Assessment WFL  (AROM)   Strength RLE   RLE Overall Strength   (good -)   LLE Assessment   LLE Assessment X  ((L) ankle immobilized/not tested)    Strength LLE   LLE Overall Strength   (at least fair hip and knee; ankle not tested)   Bed Mobility   Supine to Sit 6  Modified independent   Sit to Supine 6  Modified independent   Transfers   Sit to Stand 5  Supervision   Additional items Verbal cues  (reviewed hands placement)   Stand to Sit 5  Supervision   Additional items Verbal cues  (reviewed hands placement)   Ambulation/Elevation   Gait pattern Short stride;Excessively slow  (hop-to pattern)   Gait Assistance 4  Minimal assist   Additional items Assist x 1;Verbal cues;Tactile cues   Assistive Device Rolling walker   Distance 16 ft   Balance   Static Sitting Good   Dynamic Sitting Fair +   Static Standing Fair   Dynamic Standing Fair -   Ambulatory Poor +   Activity Tolerance   Activity Tolerance Patient limited by fatigue   Medical Staff Made Aware Ana Cristina performed w/ OTR due to complexity of medical status   Nurse Made Aware spoke to INDER Damian   Assessment   Prognosis Good   Problem List Decreased strength;Decreased endurance;Impaired balance;Decreased mobility;Orthopedic restrictions   Assessment Pt is 68 y.o. female admitted with hx of fall down steps at home and Dx of Thoracic compression fracture, L 5th metatarsal shaft fracture, closed L 4th metatarsal neck fracture, closed and head laceration. Pt 's personal factors affecting POC include: steps in the house, working and ? level of (A) available at home. Pt's clinical presentation is currently  unstable/unpredictable which is evident in postop guarding, ortho restrictions and tolerance to only 16 feet of ambulation. Pt presents w/ min overall weakness, decreased functional endurance and inconsistent amb balance and gait patterns w/ close guarding requiring for amb w/ rw. Will cont to follow pt in PT for progressive mobilization to max level of (I), endurance, and safety. D/C recommendations are outlined below. Will cont to follow until then.   Goals   Patient Goals to return home   STG Expiration  Date 07/02/24   Short Term Goal #1 3-5 days. Pt will amb 50 ft w/ rw, mod (I) in order to facilitate safe return to premorbid environment and to at least household amb status. Pt will negotiate 19 steps w/ appropriate technique, (S)x1 in order to assure safe navigation between the levels of the premorbid living environment. Pt will perform transfers w/ mod (I) to assure (I) and safety w/ functional mobility/transitions w/ all aspects of mobility/locomotion. Pt will participate in LE therex and balance activities to max progression w/ mobility skills.   PT Treatment Day 0   Plan   Treatment/Interventions Functional transfer training;LE strengthening/ROM;Elevations;Therapeutic exercise;Endurance training;Equipment eval/education;Gait training;Spoke to nursing;OT  (communicated w/ CM via secure chat)   PT Frequency 3-5x/wk   Discharge Recommendation   Rehab Resource Intensity Level, PT III (Minimum Resource Intensity)   Equipment Recommended Walker;Other (Comment)  (BSC; 1st floor set-up at least initially upon D/C)   Walker Package Recommended Wheeled walker   AM-PAC Basic Mobility Inpatient   Turning in Flat Bed Without Bedrails 4   Lying on Back to Sitting on Edge of Flat Bed Without Bedrails 4   Moving Bed to Chair 3   Standing Up From Chair Using Arms 3   Walk in Room 3   Climb 3-5 Stairs With Railing 2   Basic Mobility Inpatient Raw Score 19   Basic Mobility Standardized Score 42.48   [facility] Highest Level Of Mobility   -HLM Goal 6: Walk 10 steps or more   JH-HLM Achieved 6: Walk 10 steps or more   Modified [place] Scale   Modified [place] Scale 4   End of Consult   Patient Position at End of Consult Supine;All needs within reach         Nelson Heller, PT

## 2024-06-27 NOTE — DISCHARGE INSTRUCTIONS
Your CT showed: Age indeterminant moderate compression deformities of the T5 and T7 vertebral bodies. **A 3 mm right lower lobe solid pulmonary nodule.** Based on current Fleischner Society 2017 Guidelines on incidental pulmonary nodule, no routine follow-up is needed if the patient is low risk. If the patient is high risk, optional follow-up chest CT at 12 , months can be considered.     Please follow up with your primary care provider (you have been given a referral for your pulmonary nodule and your high blood pressure readings), the neurosurgery team as advised for compression fracture, and podiatry for the left foot fractures.    Please return to the Emergency Department if you experience worsening of your current symptoms, recurrent falls, changes in vision or hearing, severe headache, numbness/weakness of the extremities chest pain, abdominal pain, or any new/other concerning symptoms.

## 2024-06-28 ENCOUNTER — TELEPHONE (OUTPATIENT)
Age: 69
End: 2024-06-28

## 2024-06-28 NOTE — ED ATTENDING ATTESTATION
6/27/2024  I, Ashu Vargas MD, saw and evaluated the patient. I have discussed the patient with the resident/non-physician practitioner and agree with the resident's/non-physician practitioner's findings, Plan of Care, and MDM as documented in the resident's/non-physician practitioner's note, except where noted. All available labs and Radiology studies were reviewed.  I was present for key portions of any procedure(s) performed by the resident/non-physician practitioner and I was immediately available to provide assistance.       At this point I agree with the current assessment done in the Emergency Department.  I have conducted an independent evaluation of this patient a history and physical is as follows:    68-year-old female presents to the emergency department for evaluation after a fall down steps with positive head strike.  Patient does not take any blood thinners or antiplatelet medications daily.  Her biggest complaint at this time is a large scalp laceration.  She denies losing consciousness.  Denies any neck or back pain.  No numbness or tingling.  No chest pain, shortness of breath, abdominal pain, nausea or vomiting.  She did notice some swelling and bruising to the left foot.  No other complaints at this time.  Has multiple facial abrasions as well.    On exam, patient was comfortably in bed in no acute distress, head is normocephalic, large laceration noted on the scalp.  Facial abrasions present.  No specific facial tenderness.  No midline neck or back tenderness, no step-offs or deformities.  Pelvis is stable.  Full range of motion of all extremities.  Abdomen is soft, nontender nondistended without rebound or guarding.  There is swelling and bruising over the dorsal left foot.    Will get CT scan of the head to evaluate for possible intracranial hemorrhage.  Will also scan the cervical spine, chest, abdomen and pelvis to evaluate for acute traumatic injuries.  Will x-ray the left foot given the  swelling and bruising.  Fast was negative.  Will update tetanus and perform laceration repair.    CT scans were negative for any acute findings other than age-indeterminate compression deformities of T5 and T7.  Left foot x-ray did demonstrate displaced metatarsal fractures.  The large scalp laceration was irrigated and repaired with staples without complication.    Patient signed out pending neurosurgery and podiatry consults.    ED Course         Critical Care Time  Procedures

## 2024-06-28 NOTE — TELEPHONE ENCOUNTER
Caller: Susan Hicmkan    Doctor: Nava Ferguson    Reason for call: Melinda broke her foot/left.  She was in the ER last night.  They advised that she be seen within the week.  Can we do a force on for her?  Thank you.     Call back#: [phone]

## 2024-07-02 ENCOUNTER — OFFICE VISIT (OUTPATIENT)
Dept: PODIATRY | Facility: CLINIC | Age: 69
End: 2024-07-02
Payer: COMMERCIAL

## 2024-07-02 VITALS
DIASTOLIC BLOOD PRESSURE: 81 MMHG | SYSTOLIC BLOOD PRESSURE: 155 MMHG | HEIGHT: 68 IN | BODY MASS INDEX: 27.28 KG/M2 | HEART RATE: 83 BPM | WEIGHT: 180 LBS

## 2024-07-02 DIAGNOSIS — S92.342A CLOSED FRACTURE OF FOURTH METATARSAL BONE OF LEFT FOOT, INITIAL ENCOUNTER: ICD-10-CM

## 2024-07-02 DIAGNOSIS — S92.352A CLOSED DISPLACED FRACTURE OF FIFTH METATARSAL BONE OF LEFT FOOT, INITIAL ENCOUNTER: Primary | ICD-10-CM

## 2024-07-02 DIAGNOSIS — Z01.818 PRE-OP TESTING: ICD-10-CM

## 2024-07-02 PROCEDURE — 99204 OFFICE O/P NEW MOD 45 MIN: CPT | Performed by: PODIATRIST

## 2024-07-02 RX ORDER — CHLORHEXIDINE GLUCONATE ORAL RINSE 1.2 MG/ML
15 SOLUTION DENTAL ONCE
OUTPATIENT
Start: 2024-07-02 | End: 2024-07-02

## 2024-07-02 RX ORDER — CHLORHEXIDINE GLUCONATE 40 MG/ML
SOLUTION TOPICAL DAILY PRN
OUTPATIENT
Start: 2024-07-02

## 2024-07-02 NOTE — PROGRESS NOTES
PATIENT:  Melinda Hickman  1955         ASSESSMENT:     1. Closed displaced fracture of fifth metatarsal bone of left foot, initial encounter  Case request operating room: OPEN REDUCTION W/ INTERNAL FIXATION LEFT 5TH METATARSAL AND POSSIBLE 4TH METATARSAL    Case request operating room: OPEN REDUCTION W/ INTERNAL FIXATION LEFT 5TH METATARSAL AND POSSIBLE 4TH METATARSAL      2. Closed fracture of fourth metatarsal bone of left foot, initial encounter  Case request operating room: OPEN REDUCTION W/ INTERNAL FIXATION LEFT 5TH METATARSAL AND POSSIBLE 4TH METATARSAL    Case request operating room: OPEN REDUCTION W/ INTERNAL FIXATION LEFT 5TH METATARSAL AND POSSIBLE 4TH METATARSAL      3. Pre-op testing  CBC and differential    Comprehensive metabolic panel    EKG 12 lead                PLAN:  1. Reviewed medical records.  Reviewed the note from ED.  Patient was counseled and educated on the condition and the diagnosis.    2. X-ray was personally reviewed.  The radiological findings were discussed with the patient.    3. The diagnosis, treatment options and prognosis were discussed with the patient.    4. She has displaced fracture on left 5th metatarsal.  Alignment is fairly good on left 4th metatarsal fracture.  ORIF would give her optimal chance of healing and she wishes to have surgical treatment.    5. Informed consent obtained.  Explained surgical details and post-op course.  Discussed all risks and complications related to the patient's condition and surgery.  The benefits of surgery were also discussed.  The patient understood that the surgery would not guarantee desirable outcome.  All questions and concerns were addressed.  6. Plan OR this Friday or Monday.  Will send her to PAT and medical clearance.        Imaging: I have personally reviewed pertinent films in PACS  Labs, pathology, and Other Studies: I have personally reviewed pertinent reports.        Subjective:       HPI  The patient  presents with chief complaint of left foot injury.  She fell on steps and injured left foot last Thursday.  She went to ED.  X-ray was showed fracture on left 4th and 5th toe joint.  Attempted closed reduction in ED.  Her pain has been better.  She feels well otherwise.  No significant numbness or paresthesia.  No significant weakness.         The following portions of the patient's history were reviewed and updated as appropriate: allergies, current medications, past family history, past medical history, past social history, past surgical history and problem list.  All pertinent labs and images were reviewed.      Past Medical History  History reviewed. No pertinent past medical history.    Past Surgical History  History reviewed. No pertinent surgical history.     Allergies:  Pollen extract    Medications:  No current outpatient medications on file.     No current facility-administered medications for this visit.       Social History:  Social History     Socioeconomic History    Marital status: Single     Spouse name: None    Number of children: None    Years of education: None    Highest education level: None   Occupational History    None   Tobacco Use    Smoking status: Former     Current packs/day: 0.00     Types: Cigarettes     Quit date:      Years since quittin.5    Smokeless tobacco: None   Substance and Sexual Activity    Alcohol use: Not Currently    Drug use: Never    Sexual activity: None   Other Topics Concern    None   Social History Narrative    None     Social Determinants of Health     Financial Resource Strain: Not on file   Food Insecurity: Not on file   Transportation Needs: Not on file   Physical Activity: Not on file   Stress: Not on file   Social Connections: Not on file   Intimate Partner Violence: Not on file   Housing Stability: Not on file          Review of Systems   Constitutional:  Negative for chills and fever.   Respiratory:  Negative for cough and shortness of breath.   "  Cardiovascular:  Negative for chest pain.   Gastrointestinal:  Negative for nausea and vomiting.   Musculoskeletal:  Positive for gait problem and joint swelling.   Skin:  Negative for wound.   Allergic/Immunologic: Negative for immunocompromised state.   Neurological:  Negative for weakness and numbness.   Hematological: Negative.    Psychiatric/Behavioral:  Negative for behavioral problems and confusion.          Objective:      /81 (BP Location: Left arm, Patient Position: Sitting, Cuff Size: Large)   Pulse 83   Ht 5' 8\" (1.727 m) Comment: verbal  Wt 81.6 kg (180 lb) Comment: verbal  BMI 27.37 kg/m²          Physical Exam  Vitals reviewed.   Constitutional:       General: She is not in acute distress.     Appearance: She is not toxic-appearing or diaphoretic.   HENT:      Head: Normocephalic and atraumatic.   Eyes:      Extraocular Movements: Extraocular movements intact.   Cardiovascular:      Rate and Rhythm: Normal rate and regular rhythm.      Pulses: Normal pulses.           Dorsalis pedis pulses are 2+ on the right side and 2+ on the left side.        Posterior tibial pulses are 2+ on the right side and 2+ on the left side.   Pulmonary:      Effort: Pulmonary effort is normal. No respiratory distress.   Musculoskeletal:         General: Swelling, tenderness and signs of injury present.      Cervical back: Normal range of motion and neck supple.      Right foot: No foot drop.      Left foot: No foot drop.      Comments: Pain and swelling around left 4th and 5th metatarsal.  Decreased active ROM due to swelling and pain left foot.  No calf pain.     Skin:     General: Skin is warm.      Capillary Refill: Capillary refill takes less than 2 seconds.      Coloration: Skin is not cyanotic or mottled.      Findings: No abscess.      Nails: There is no clubbing.   Neurological:      General: No focal deficit present.      Mental Status: She is alert and oriented to person, place, and time.      Cranial " Nerves: No cranial nerve deficit.      Sensory: No sensory deficit.      Motor: No weakness.      Coordination: Coordination normal.   Psychiatric:         Mood and Affect: Mood normal.         Behavior: Behavior normal.         Thought Content: Thought content normal.         Judgment: Judgment normal.

## 2024-07-03 ENCOUNTER — TELEPHONE (OUTPATIENT)
Dept: PODIATRY | Facility: CLINIC | Age: 69
End: 2024-07-03

## 2024-07-03 NOTE — TELEPHONE ENCOUNTER
Caller: Melinda Hickman    Doctor: Ty Villanueva DPM    Reason for call: Melinda was told by Dr. Villanueva that she would be having surgery either Friday or Monday.  He said someone would call her today, she has not heard from the office.  Please have someone call her.    Call back#: [phone]

## 2024-07-05 ENCOUNTER — ANESTHESIA EVENT (OUTPATIENT)
Dept: PERIOP | Facility: AMBULARY SURGERY CENTER | Age: 69
End: 2024-07-05
Payer: COMMERCIAL

## 2024-07-05 DIAGNOSIS — Z01.818 PRE-OP EVALUATION: Primary | ICD-10-CM

## 2024-07-05 RX ORDER — DIPHENOXYLATE HYDROCHLORIDE AND ATROPINE SULFATE 2.5; .025 MG/1; MG/1
1 TABLET ORAL DAILY
COMMUNITY

## 2024-07-05 NOTE — PRE-PROCEDURE INSTRUCTIONS
Pre-Surgery Instructions:   Medication Instructions    multivitamin (THERAGRAN) TABS Stop taking 3 days prior to surgery.    Medication instructions for day surgery reviewed. Please use only a sip of water to take your instructed medications. Avoid all over the counter vitamins, supplements and NSAIDS for one week prior to surgery per anesthesia guidelines. Tylenol is ok to take as needed.     You will receive a call one business day prior to surgery with an arrival time and hospital directions. If your surgery is scheduled on a Monday, the hospital will be calling you on the Friday prior to your surgery. If you have not heard from anyone by 8pm, please call the hospital supervisor through the hospital  at [phone]. ([place] [phone] or [place] [phone]).    Do not eat or drink anything after midnight the night before your surgery, including candy, mints, lifesavers, or chewing gum. Do not drink alcohol 24hrs before your surgery. Try not to smoke at least 24hrs before your surgery.       Follow the pre surgery showering instructions as listed in the “My Surgical Experience Booklet” or otherwise provided by your surgeon's office. Do not use a blade to shave the surgical area 1 week before surgery. It is okay to use a clean electric clippers up to 24 hours before surgery. Do not apply any lotions, creams, including makeup, cologne, deodorant, or perfumes after showering on the day of your surgery. Do not use dry shampoo, hair spray, hair gel, or any type of hair products.     No contact lenses, eye make-up, or artificial eyelashes. Remove nail polish, including gel polish, and any artificial, gel, or acrylic nails if possible. Remove all jewelry including rings and body piercing jewelry.     Wear causal clothing that is easy to take on and off. Consider your type of surgery.    Keep any valuables, jewelry, piercings at home. Please bring any specially ordered equipment (sling, braces) if  indicated.    Arrange for a responsible person to drive you to and from the hospital on the day of your surgery. Please confirm the visitor policy for the day of your procedure when you receive your phone call with an arrival time.     Call the surgeon's office with any new illnesses, exposures, or additional questions prior to surgery.    Please reference your “My Surgical Experience Booklet” for additional information to prepare for your upcoming surgery.

## 2024-07-06 ENCOUNTER — APPOINTMENT (OUTPATIENT)
Dept: LAB | Age: 69
End: 2024-07-06
Payer: COMMERCIAL

## 2024-07-06 DIAGNOSIS — Z01.818 PRE-OP TESTING: ICD-10-CM

## 2024-07-06 LAB
ALBUMIN SERPL BCG-MCNC: 4.1 G/DL (ref 3.5–5)
ALP SERPL-CCNC: 94 U/L (ref 34–104)
ALT SERPL W P-5'-P-CCNC: 15 U/L (ref 7–52)
ANION GAP SERPL CALCULATED.3IONS-SCNC: 13 MMOL/L (ref 4–13)
AST SERPL W P-5'-P-CCNC: 16 U/L (ref 13–39)
ATRIAL RATE: 87 BPM
BASOPHILS # BLD AUTO: 0.04 THOUSANDS/ÂΜL (ref 0–0.1)
BASOPHILS NFR BLD AUTO: 1 % (ref 0–1)
BILIRUB SERPL-MCNC: 0.4 MG/DL (ref 0.2–1)
BUN SERPL-MCNC: 16 MG/DL (ref 5–25)
CALCIUM SERPL-MCNC: 9.4 MG/DL (ref 8.4–10.2)
CHLORIDE SERPL-SCNC: 101 MMOL/L (ref 96–108)
CO2 SERPL-SCNC: 26 MMOL/L (ref 21–32)
CREAT SERPL-MCNC: 0.72 MG/DL (ref 0.6–1.3)
EOSINOPHIL # BLD AUTO: 0.11 THOUSAND/ÂΜL (ref 0–0.61)
EOSINOPHIL NFR BLD AUTO: 1 % (ref 0–6)
ERYTHROCYTE [DISTWIDTH] IN BLOOD BY AUTOMATED COUNT: 12.6 % (ref 11.6–15.1)
GFR SERPL CREATININE-BSD FRML MDRD: 86 ML/MIN/1.73SQ M
GLUCOSE P FAST SERPL-MCNC: 97 MG/DL (ref 65–99)
HCT VFR BLD AUTO: 47.6 % (ref 34.8–46.1)
HGB BLD-MCNC: 15.3 G/DL (ref 11.5–15.4)
IMM GRANULOCYTES # BLD AUTO: 0.03 THOUSAND/UL (ref 0–0.2)
IMM GRANULOCYTES NFR BLD AUTO: 0 % (ref 0–2)
LYMPHOCYTES # BLD AUTO: 1.5 THOUSANDS/ÂΜL (ref 0.6–4.47)
LYMPHOCYTES NFR BLD AUTO: 18 % (ref 14–44)
MCH RBC QN AUTO: 31.4 PG (ref 26.8–34.3)
MCHC RBC AUTO-ENTMCNC: 32.1 G/DL (ref 31.4–37.4)
MCV RBC AUTO: 98 FL (ref 82–98)
MONOCYTES # BLD AUTO: 0.54 THOUSAND/ÂΜL (ref 0.17–1.22)
MONOCYTES NFR BLD AUTO: 6 % (ref 4–12)
NEUTROPHILS # BLD AUTO: 6.21 THOUSANDS/ÂΜL (ref 1.85–7.62)
NEUTS SEG NFR BLD AUTO: 74 % (ref 43–75)
NRBC BLD AUTO-RTO: 0 /100 WBCS
P AXIS: 60 DEGREES
PLATELET # BLD AUTO: 324 THOUSANDS/UL (ref 149–390)
PMV BLD AUTO: 11.3 FL (ref 8.9–12.7)
POTASSIUM SERPL-SCNC: 4.8 MMOL/L (ref 3.5–5.3)
PR INTERVAL: 110 MS
PROT SERPL-MCNC: 8 G/DL (ref 6.4–8.4)
QRS AXIS: 12 DEGREES
QRSD INTERVAL: 80 MS
QT INTERVAL: 362 MS
QTC INTERVAL: 435 MS
RBC # BLD AUTO: 4.88 MILLION/UL (ref 3.81–5.12)
SODIUM SERPL-SCNC: 140 MMOL/L (ref 135–147)
T WAVE AXIS: 72 DEGREES
VENTRICULAR RATE: 87 BPM
WBC # BLD AUTO: 8.43 THOUSAND/UL (ref 4.31–10.16)

## 2024-07-06 PROCEDURE — 80053 COMPREHEN METABOLIC PANEL: CPT

## 2024-07-06 PROCEDURE — 93005 ELECTROCARDIOGRAM TRACING: CPT

## 2024-07-06 PROCEDURE — 93010 ELECTROCARDIOGRAM REPORT: CPT | Performed by: INTERNAL MEDICINE

## 2024-07-06 PROCEDURE — 36415 COLL VENOUS BLD VENIPUNCTURE: CPT

## 2024-07-06 PROCEDURE — 85025 COMPLETE CBC W/AUTO DIFF WBC: CPT

## 2024-07-08 ENCOUNTER — CONSULT (OUTPATIENT)
Dept: INTERNAL MEDICINE CLINIC | Facility: CLINIC | Age: 69
End: 2024-07-08
Payer: COMMERCIAL

## 2024-07-08 VITALS
HEART RATE: 87 BPM | SYSTOLIC BLOOD PRESSURE: 154 MMHG | DIASTOLIC BLOOD PRESSURE: 84 MMHG | TEMPERATURE: 97.3 F | WEIGHT: 182.5 LBS | BODY MASS INDEX: 27.66 KG/M2 | HEIGHT: 68 IN | OXYGEN SATURATION: 97 %

## 2024-07-08 DIAGNOSIS — S92.342D: ICD-10-CM

## 2024-07-08 DIAGNOSIS — Z78.0 ENCOUNTER FOR OSTEOPOROSIS SCREENING IN ASYMPTOMATIC POSTMENOPAUSAL PATIENT: ICD-10-CM

## 2024-07-08 DIAGNOSIS — R03.0 ELEVATED BLOOD PRESSURE READING: ICD-10-CM

## 2024-07-08 DIAGNOSIS — S92.352D CLOSED DISPLACED FRACTURE OF FIFTH METATARSAL BONE OF LEFT FOOT WITH ROUTINE HEALING, SUBSEQUENT ENCOUNTER: ICD-10-CM

## 2024-07-08 DIAGNOSIS — R91.1 PULMONARY NODULE: ICD-10-CM

## 2024-07-08 DIAGNOSIS — Z13.820 ENCOUNTER FOR OSTEOPOROSIS SCREENING IN ASYMPTOMATIC POSTMENOPAUSAL PATIENT: ICD-10-CM

## 2024-07-08 DIAGNOSIS — S22.000D COMPRESSION FRACTURE OF THORACIC VERTEBRA WITH ROUTINE HEALING, UNSPECIFIED THORACIC VERTEBRAL LEVEL, SUBSEQUENT ENCOUNTER: ICD-10-CM

## 2024-07-08 DIAGNOSIS — Z01.818 PRE-OPERATIVE EXAM: Primary | ICD-10-CM

## 2024-07-08 PROCEDURE — 99203 OFFICE O/P NEW LOW 30 MIN: CPT | Performed by: INTERNAL MEDICINE

## 2024-07-08 NOTE — PROGRESS NOTES
Assessment/Plan:    Diagnoses and all orders for this visit:    Pre-operative exam    Closed displaced fracture of fifth metatarsal bone of left foot with routine healing, subsequent encounter    Closed fracture of fourth metatarsal bone of left foot with routine healing, physeal involvement unspecified, subsequent encounter    Compression fracture of thoracic vertebra with routine healing, unspecified thoracic vertebral level, subsequent encounter    Encounter for osteoporosis screening in asymptomatic postmenopausal patient       Patient comes for preoperative visit  Sustained a fall from a flight of stairs about a week ago, with a left leg pain, imaging with left fourth and fifth metatarsal fracture, planned for ORIF by Dr. Villanueva on 7/9/2024.  Has not access to medical care in the last several years, denies any subjective complaints today, denies chronic medical conditions or medication intake.  He denies use of AC/AP/OTC supplements.    Lab work reviewed, EKG sinus rhythm    Patient is at low risk of for low risk of procedure.    Possible osteoporosis given thoracic compression fractures which are asymptomatic now.  Patient will follow-up with me at a later date for further evaluation and management.         There are no Patient Instructions on file for this visit.    Subjective:      Patient ID: Melinda Hickman is a 69 y.o. female    HPI      Current Outpatient Medications:     multivitamin (THERAGRAN) TABS, Take 1 tablet by mouth daily, Disp: , Rfl:      Past Medical History:   Diagnosis Date    Allergic 1/1/1960    Allergic to pollen my whole life    Hypertension          Past Surgical History:   Procedure Laterality Date    NO PAST SURGERIES           Allergies   Allergen Reactions    Pollen Extract Nasal Congestion     Seasonal       Recent Results (from the past 1008 hour(s))   Commode    Collection Time: 06/27/24 11:57 AM   Result Value Ref Range    Supplier Name [facility]      Supplier Phone Number [phone]     Order Status Completed     Delivery Note      Delivery Request Date 06/27/2024     Item Description 3 in 1 Commode    CBC and differential    Collection Time: 07/06/24 11:41 AM   Result Value Ref Range    WBC 8.43 4.31 - 10.16 Thousand/uL    RBC 4.88 3.81 - 5.12 Million/uL    Hemoglobin 15.3 11.5 - 15.4 g/dL    Hematocrit 47.6 (H) 34.8 - 46.1 %    MCV 98 82 - 98 fL    MCH 31.4 26.8 - 34.3 pg    MCHC 32.1 31.4 - 37.4 g/dL    RDW 12.6 11.6 - 15.1 %    MPV 11.3 8.9 - 12.7 fL    Platelets 324 149 - 390 Thousands/uL    nRBC 0 /100 WBCs    Segmented % 74 43 - 75 %    Immature Grans % 0 0 - 2 %    Lymphocytes % 18 14 - 44 %    Monocytes % 6 4 - 12 %    Eosinophils Relative 1 0 - 6 %    Basophils Relative 1 0 - 1 %    Absolute Neutrophils 6.21 1.85 - 7.62 Thousands/µL    Absolute Immature Grans 0.03 0.00 - 0.20 Thousand/uL    Absolute Lymphocytes 1.50 0.60 - 4.47 Thousands/µL    Absolute Monocytes 0.54 0.17 - 1.22 Thousand/µL    Eosinophils Absolute 0.11 0.00 - 0.61 Thousand/µL    Basophils Absolute 0.04 0.00 - 0.10 Thousands/µL   Comprehensive metabolic panel    Collection Time: 07/06/24 11:41 AM   Result Value Ref Range    Sodium 140 135 - 147 mmol/L    Potassium 4.8 3.5 - 5.3 mmol/L    Chloride 101 96 - 108 mmol/L    CO2 26 21 - 32 mmol/L    ANION GAP 13 4 - 13 mmol/L    BUN 16 5 - 25 mg/dL    Creatinine 0.72 0.60 - 1.30 mg/dL    Glucose, Fasting 97 65 - 99 mg/dL    Calcium 9.4 8.4 - 10.2 mg/dL    AST 16 13 - 39 U/L    ALT 15 7 - 52 U/L    Alkaline Phosphatase 94 34 - 104 U/L    Total Protein 8.0 6.4 - 8.4 g/dL    Albumin 4.1 3.5 - 5.0 g/dL    Total Bilirubin 0.40 0.20 - 1.00 mg/dL    eGFR 86 ml/min/1.73sq m   EKG 12 lead    Collection Time: 07/06/24 11:53 AM   Result Value Ref Range    Ventricular Rate 87 BPM    Atrial Rate 87 BPM    UT Interval 110 ms    QRSD Interval 80 ms    QT Interval 362 ms    QTC Interval 435 ms    P Axis 60 degrees    QRS Axis 12 degrees    T Wave Axis 72  degrees       The following portions of the patient's history were reviewed and updated as appropriate: allergies, current medications, past family history, past medical history, past social history, past surgical history and problem list.     Review of Systems   Constitutional:  Negative for appetite change, chills, diaphoresis, fatigue, fever and unexpected weight change.   Respiratory:  Negative for apnea, cough, choking, chest tightness, shortness of breath, wheezing and stridor.    Cardiovascular:  Negative for chest pain, palpitations and leg swelling.   Gastrointestinal:  Negative for abdominal distention, abdominal pain, anal bleeding, blood in stool, constipation, diarrhea, nausea and vomiting.   Genitourinary:  Negative for decreased urine volume, difficulty urinating, frequency and urgency.   Musculoskeletal:  Negative for arthralgias, back pain and myalgias.        Left foot pain   Neurological:  Negative for dizziness, light-headedness, numbness and headaches.         Objective:      Vitals:    07/08/24 1041   BP: 154/84   Pulse: 87   Temp: (!) 97.3 °F (36.3 °C)   SpO2: 97%          Physical Exam  Vitals reviewed.   Constitutional:       General: She is not in acute distress.     Appearance: Normal appearance. She is not ill-appearing, toxic-appearing or diaphoretic.   HENT:      Mouth/Throat:      Mouth: Mucous membranes are moist.   Cardiovascular:      Rate and Rhythm: Normal rate and regular rhythm.      Pulses: Normal pulses.      Heart sounds: Normal heart sounds. No murmur heard.     No friction rub. No gallop.   Pulmonary:      Effort: Pulmonary effort is normal. No respiratory distress.      Breath sounds: Normal breath sounds. No stridor. No wheezing, rhonchi or rales.   Chest:      Chest wall: No tenderness.   Musculoskeletal:      Right lower leg: No edema.      Left lower leg: No edema.      Comments: Left foot in cam boot   Skin:     General: Skin is warm and dry.      Findings: No lesion  or rash.   Neurological:      Mental Status: She is alert.

## 2024-07-09 ENCOUNTER — APPOINTMENT (OUTPATIENT)
Dept: RADIOLOGY | Facility: AMBULARY SURGERY CENTER | Age: 69
End: 2024-07-09
Payer: COMMERCIAL

## 2024-07-09 ENCOUNTER — HOSPITAL ENCOUNTER (OUTPATIENT)
Facility: AMBULARY SURGERY CENTER | Age: 69
Setting detail: OUTPATIENT SURGERY
Discharge: HOME | End: 2024-07-09
Attending: PODIATRIST | Admitting: PODIATRIST
Payer: COMMERCIAL

## 2024-07-09 ENCOUNTER — ANESTHESIA (OUTPATIENT)
Dept: PERIOP | Facility: AMBULARY SURGERY CENTER | Age: 69
End: 2024-07-09
Payer: COMMERCIAL

## 2024-07-09 VITALS
OXYGEN SATURATION: 95 % | DIASTOLIC BLOOD PRESSURE: 72 MMHG | TEMPERATURE: 97.2 F | HEIGHT: 68 IN | HEART RATE: 74 BPM | SYSTOLIC BLOOD PRESSURE: 159 MMHG | WEIGHT: 180 LBS | BODY MASS INDEX: 27.28 KG/M2 | RESPIRATION RATE: 18 BRPM

## 2024-07-09 DIAGNOSIS — Z98.890 POST-OPERATIVE STATE: ICD-10-CM

## 2024-07-09 DIAGNOSIS — G89.18 POST-OPERATIVE PAIN: Primary | ICD-10-CM

## 2024-07-09 PROCEDURE — C1713 ANCHOR/SCREW BN/BN,TIS/BN: HCPCS | Performed by: PODIATRIST

## 2024-07-09 PROCEDURE — C1769 GUIDE WIRE: HCPCS | Performed by: PODIATRIST

## 2024-07-09 PROCEDURE — 73620 X-RAY EXAM OF FOOT: CPT

## 2024-07-09 PROCEDURE — C9290 INJ, BUPIVACAINE LIPOSOME: HCPCS

## 2024-07-09 PROCEDURE — 73630 X-RAY EXAM OF FOOT: CPT

## 2024-07-09 PROCEDURE — 28485 OPTX METATARSAL FX EACH: CPT | Performed by: PODIATRIST

## 2024-07-09 PROCEDURE — NC001 PR NO CHARGE: Performed by: PODIATRIST

## 2024-07-09 DEVICE — ALLOSYNC DBM PUTTY, 2.5CC VIAL
Type: IMPLANTABLE DEVICE | Site: FOOT | Status: FUNCTIONAL
Brand: ARTHREX

## 2024-07-09 DEVICE — IMPLANTABLE DEVICE: Type: IMPLANTABLE DEVICE | Site: FOOT | Status: FUNCTIONAL

## 2024-07-09 RX ORDER — PHENYLEPHRINE HCL IN 0.9% NACL 1 MG/10 ML
SYRINGE (ML) INTRAVENOUS AS NEEDED
Status: DISCONTINUED | OUTPATIENT
Start: 2024-07-09 | End: 2024-07-09

## 2024-07-09 RX ORDER — OXYCODONE HYDROCHLORIDE AND ACETAMINOPHEN 5; 325 MG/1; MG/1
1 TABLET ORAL EVERY 4 HOURS PRN
Qty: 20 TABLET | Refills: 0 | Status: SHIPPED | OUTPATIENT
Start: 2024-07-09

## 2024-07-09 RX ORDER — ONDANSETRON 2 MG/ML
4 INJECTION INTRAMUSCULAR; INTRAVENOUS ONCE AS NEEDED
Status: DISCONTINUED | OUTPATIENT
Start: 2024-07-09 | End: 2024-07-09 | Stop reason: HOSPADM

## 2024-07-09 RX ORDER — CHLORHEXIDINE GLUCONATE ORAL RINSE 1.2 MG/ML
15 SOLUTION DENTAL ONCE
Status: DISCONTINUED | OUTPATIENT
Start: 2024-07-09 | End: 2024-07-09 | Stop reason: HOSPADM

## 2024-07-09 RX ORDER — ONDANSETRON 2 MG/ML
INJECTION INTRAMUSCULAR; INTRAVENOUS AS NEEDED
Status: DISCONTINUED | OUTPATIENT
Start: 2024-07-09 | End: 2024-07-09

## 2024-07-09 RX ORDER — ACETAMINOPHEN 325 MG/1
650 TABLET ORAL EVERY 4 HOURS PRN
Status: DISCONTINUED | OUTPATIENT
Start: 2024-07-09 | End: 2024-07-09 | Stop reason: HOSPADM

## 2024-07-09 RX ORDER — FENTANYL CITRATE 50 UG/ML
INJECTION, SOLUTION INTRAMUSCULAR; INTRAVENOUS AS NEEDED
Status: DISCONTINUED | OUTPATIENT
Start: 2024-07-09 | End: 2024-07-09

## 2024-07-09 RX ORDER — PROPOFOL 10 MG/ML
INJECTION, EMULSION INTRAVENOUS CONTINUOUS PRN
Status: DISCONTINUED | OUTPATIENT
Start: 2024-07-09 | End: 2024-07-09

## 2024-07-09 RX ORDER — SODIUM CHLORIDE, SODIUM LACTATE, POTASSIUM CHLORIDE, CALCIUM CHLORIDE 600; 310; 30; 20 MG/100ML; MG/100ML; MG/100ML; MG/100ML
INJECTION, SOLUTION INTRAVENOUS CONTINUOUS PRN
Status: DISCONTINUED | OUTPATIENT
Start: 2024-07-09 | End: 2024-07-09

## 2024-07-09 RX ORDER — MAGNESIUM HYDROXIDE 1200 MG/15ML
LIQUID ORAL AS NEEDED
Status: DISCONTINUED | OUTPATIENT
Start: 2024-07-09 | End: 2024-07-09 | Stop reason: HOSPADM

## 2024-07-09 RX ORDER — DEXAMETHASONE SODIUM PHOSPHATE 10 MG/ML
INJECTION, SOLUTION INTRAMUSCULAR; INTRAVENOUS AS NEEDED
Status: DISCONTINUED | OUTPATIENT
Start: 2024-07-09 | End: 2024-07-09

## 2024-07-09 RX ORDER — CEPHALEXIN 250 MG/1
250 CAPSULE ORAL 4 TIMES DAILY
Qty: 28 CAPSULE | Refills: 0 | Status: SHIPPED | OUTPATIENT
Start: 2024-07-09 | End: 2024-07-16

## 2024-07-09 RX ORDER — CHLORHEXIDINE GLUCONATE 40 MG/ML
SOLUTION TOPICAL DAILY PRN
Status: DISCONTINUED | OUTPATIENT
Start: 2024-07-09 | End: 2024-07-09 | Stop reason: HOSPADM

## 2024-07-09 RX ORDER — ASPIRIN 81 MG/1
81 TABLET, CHEWABLE ORAL DAILY
Qty: 30 TABLET | Refills: 0 | Status: SHIPPED | OUTPATIENT
Start: 2024-07-09 | End: 2024-08-08

## 2024-07-09 RX ORDER — OXYCODONE HYDROCHLORIDE AND ACETAMINOPHEN 5; 325 MG/1; MG/1
1 TABLET ORAL EVERY 4 HOURS PRN
Status: DISCONTINUED | OUTPATIENT
Start: 2024-07-09 | End: 2024-07-09 | Stop reason: HOSPADM

## 2024-07-09 RX ORDER — FENTANYL CITRATE/PF 50 MCG/ML
25 SYRINGE (ML) INJECTION
Status: DISCONTINUED | OUTPATIENT
Start: 2024-07-09 | End: 2024-07-09 | Stop reason: HOSPADM

## 2024-07-09 RX ORDER — MIDAZOLAM HYDROCHLORIDE 2 MG/2ML
INJECTION, SOLUTION INTRAMUSCULAR; INTRAVENOUS AS NEEDED
Status: DISCONTINUED | OUTPATIENT
Start: 2024-07-09 | End: 2024-07-09

## 2024-07-09 RX ORDER — PROPOFOL 10 MG/ML
INJECTION, EMULSION INTRAVENOUS AS NEEDED
Status: DISCONTINUED | OUTPATIENT
Start: 2024-07-09 | End: 2024-07-09

## 2024-07-09 RX ORDER — CEFAZOLIN SODIUM 2 G/50ML
2000 SOLUTION INTRAVENOUS ONCE
Status: COMPLETED | OUTPATIENT
Start: 2024-07-09 | End: 2024-07-09

## 2024-07-09 RX ORDER — SODIUM CHLORIDE, SODIUM LACTATE, POTASSIUM CHLORIDE, CALCIUM CHLORIDE 600; 310; 30; 20 MG/100ML; MG/100ML; MG/100ML; MG/100ML
50 INJECTION, SOLUTION INTRAVENOUS CONTINUOUS
Status: CANCELLED | OUTPATIENT
Start: 2024-07-09

## 2024-07-09 RX ADMIN — Medication 100 MCG: at 07:45

## 2024-07-09 RX ADMIN — SODIUM CHLORIDE, SODIUM LACTATE, POTASSIUM CHLORIDE, AND CALCIUM CHLORIDE: .6; .31; .03; .02 INJECTION, SOLUTION INTRAVENOUS at 07:16

## 2024-07-09 RX ADMIN — MIDAZOLAM 2 MG: 1 INJECTION INTRAMUSCULAR; INTRAVENOUS at 07:31

## 2024-07-09 RX ADMIN — FENTANYL CITRATE 50 MCG: 50 INJECTION INTRAMUSCULAR; INTRAVENOUS at 07:41

## 2024-07-09 RX ADMIN — SODIUM CHLORIDE, SODIUM LACTATE, POTASSIUM CHLORIDE, AND CALCIUM CHLORIDE: .6; .31; .03; .02 INJECTION, SOLUTION INTRAVENOUS at 09:10

## 2024-07-09 RX ADMIN — CEFAZOLIN SODIUM 2000 MG: 2 SOLUTION INTRAVENOUS at 07:31

## 2024-07-09 RX ADMIN — ONDANSETRON 4 MG: 2 INJECTION INTRAMUSCULAR; INTRAVENOUS at 07:41

## 2024-07-09 RX ADMIN — DEXAMETHASONE SODIUM PHOSPHATE 10 MG: 10 INJECTION, SOLUTION INTRAMUSCULAR; INTRAVENOUS at 07:41

## 2024-07-09 RX ADMIN — FENTANYL CITRATE 50 MCG: 50 INJECTION INTRAMUSCULAR; INTRAVENOUS at 08:45

## 2024-07-09 RX ADMIN — PROPOFOL 120 MG: 10 INJECTION, EMULSION INTRAVENOUS at 07:36

## 2024-07-09 RX ADMIN — PROPOFOL 70 MCG/KG/MIN: 10 INJECTION, EMULSION INTRAVENOUS at 07:36

## 2024-07-09 NOTE — DISCHARGE INSTR - AVS FIRST PAGE
Dr. Villanueva, DPM  Post-op surgery Instructions    Pain / Swelling  There is expected to be some discomfort, swelling and bruising of the foot. You might see some blood on the bandage. This is not a cause for alarm. However, if there is active or persistent bleeding (blood running out of the bandage while at rest) - call the office at once (or) go to a [facility] ER and ask them to page the podiatry residents.  Apply an ice bag to the top of your ankle for 30 minutes for each waking hour, for the first 72 hours. This should be discontinued when sleeping. This will also work through your cast if you have one. Ice must not leak and wet the dressings. Also, using the ice inappropriately can cause permanent nerve damage.  Your foot should be elevated as much as possible for the first 72 hours. The foot should be above heart level. If your foot is below heart level, throbbing and pain will increase.  When sleeping, elevation can be accomplished by putting a small hard suitcase between the box spring and mattress at the foot of the bed.  Walking and standing will increase pain, throbbing and bleeding.  Persistent pain despite elevation and your pain meds can many times be relieved by removing the tight brown compression layer (called the ACE wrap) that is over the white gauze dressing. If you are elevating and taking your pain meds and pain is still severe, remove this brown stretchy layer but leave the gauze intact. Wait 30 minutes. If the pain subsides, reapply the ACE so it’s not so tight. If pain doesn’t get better, call your doctor.   Dressings / Casts  Do not remove your surgical dressings - they will be changed at your doctor appointment. Do not allow surgical dressings to get wet. Sponge baths should be used until the sutures are removed.  Do not try to keep the foot dry using a garbage bag and tape - this rarely works.  If you get your dressings or cast wet - call your doctor immediately.  If your cast or  dressings feel tight - elevate your foot for 30 minutes. If this doesn’t help and you feel tingling or see toe discoloration - call your doctor or go to a [facility] ER and ask them to page the podiatry residents.   Do not put things in your cast such as powder, coat hangers to scratch, etc.. This can cause skin damage and infections.   Infection  If you have a fever at or above 100 degrees, chills, sweats, or see red streaks rising above the dressing or smell odor / see pus (creamy white drainage), call your doctor immediately or go to a [facility] ER and ask them to page the podiatry residents.  Constipation  If you have severe constipation after surgery, this can be due to the pain medication. Notify your doctor and special medication will be prescribed to deal with this.   Blood Clots  If you had surgery and are in a cast, have an external fixation device, or are non-weightbearing using crutches, a knee scooter, a wheelchair, a walker, or an iWalk device - you need to be on a blood thinner. Your doctor will prescribe one of the regimens below. If you run out of the blood thinner checked off below before you are walking normally on your foot and out of your cast - notify your doctor immediately so you can get a refill. Not doing so can lead to blood clots and serious complications including death.    Aspirin 81mg daily    Numbness  It is normal for your foot to be numb until about dinner time. If you’ve had a popliteal block procedure, you might be numb until the following day. When you start to feel pins and needles in the foot - this means the block is wearing off. That is the appropriate time to take your pain medication.   Pain Medication  Do not supplement your pain medication with over the counter drugs, old leftover pain medications, or extra Tylenol. You must discuss any additional medications with your doctor prior to taking them for pain.   Driving  No driving is allowed without  discussion with the doctor  Ambulation  Nonweightbearing to surgical foot  If given a flat, stiff shoe / darco wedge shoe, Do not walk at all without it.  Use a device (cane, walker, crutches) to take some weight off of the foot when walking  If instructed not to put weight on the surgical foot, use the following:  Crutches and CAM boot     Putting weight on the foot will lead to complications.

## 2024-07-09 NOTE — DISCHARGE SUMMARY
Discharge Summary Outpatient Procedure Podiatry -   Melinda Hickman 69 y.o. female MRN: [id number]  Unit/Bed#: OR POOL Encounter: [id number]    Admission Date: 7/9/2024     Admitting Diagnosis: Closed displaced fracture of fifth metatarsal bone of left foot, initial encounter [S92.352A]  Closed fracture of fourth metatarsal bone of left foot, initial encounter [S92.342A]    Discharge Diagnosis: same    Procedures Performed: OPEN REDUCTION W/ INTERNAL FIXATION LEFT 5TH METATARSAL: 90972 (CPT®)    Complications: none    Condition at Discharge: stable    Discharge instructions/Information to patient and family:   See after visit summary for information provided to patient and family.      Provisions for Follow-Up Care/Important appointments:  See after visit summary for information related to follow-up care and any pertinent home health orders.      Discharge Medications:  See after visit summary for reconciled discharge medications provided to patient and family.

## 2024-07-09 NOTE — ANESTHESIA PREPROCEDURE EVALUATION
Procedure:  OPEN REDUCTION W/ INTERNAL FIXATION LEFT 5TH METATARSAL AND POSSIBLE 4TH METATARSAL (Left: Foot)    Relevant Problems   Orthopedic/Musculoskeletal   (+) Thoracic compression fracture (HCC)    C5/7 fracture is old, no current symptoms    S/p mechanical fall >1 week ago, sustained foot fx, ct Head WNL    Physical Exam    Airway    Mallampati score: II  TM Distance: >3 FB  Neck ROM: full     Dental       Cardiovascular      Pulmonary      Other Findings  post-pubertal.      Anesthesia Plan  ASA Score- 2     Anesthesia Type- general with ASA Monitors.         Additional Monitors:     Airway Plan: LMA.           Plan Factors-Exercise tolerance (METS): >4 METS.    Chart reviewed.  Imaging results reviewed. Existing labs reviewed. Patient summary reviewed.    Patient is not a current smoker.              Induction- intravenous.    Postoperative Plan-         Informed Consent- Anesthetic plan and risks discussed with patient.  I personally reviewed this patient with the CRNA. Discussed and agreed on the Anesthesia Plan with the CRNA..

## 2024-07-09 NOTE — OP NOTE
OPERATIVE REPORT - Podiatry  PATIENT NAME: Melinda Hickman    :  1955  MRN: [id number]  Pt Location: [facility] OR ROOM 01    SURGERY DATE: 2024    Surgeons and Role:     * Ty Villanueva DPM - Primary     * Jimy Cox DPM - Assisting      * Veto Blum DPM - Assisting     Pre-op Diagnosis:  Closed displaced fracture of fifth metatarsal bone of left foot, initial encounter [S92.352A]  Closed fracture of fourth metatarsal bone of left foot, initial encounter [S92.342A]    Post-Op Diagnosis Codes:     * Closed displaced fracture of fifth metatarsal bone of left foot, initial encounter [S92.352A]     * Closed fracture of fourth metatarsal bone of left foot, initial encounter [S92.342A]    Procedure(s) (LRB):  OPEN REDUCTION W/ INTERNAL FIXATION LEFT 5TH METATARSAL (Left)    Specimen(s):  * No specimens in log *    Estimated Blood Loss:   Minimal    Drains:  * No LDAs found *    Anesthesia Type:   Choice with 20 ml of 0.5% Bupivacaine and 1% Lidocaine in a 1:1 mixture    Hemostasis:  - Atraumatic technique   - Manual compression   - Pneumatic ankle tourniquet     Materials:  Implant Name Type Inv. Item Serial No.  Lot No. LRB No. Used Action   C-WIRE GREEN .045 - [id number]  C-WIRE GREEN .045  [employer] [id number] Left 1 Implanted   PLATE T 2 X 42MM 6HL - [id number]  PLATE T 2 X 42MM 6HL  [employer]  Left 1 Implanted   SCREW SANTY 2 X 16MM LOPFL - [id number]  SCREW SANTY 2 X 16MM LOPFL  [employer]  Left 1 Implanted   SCREW ACOSTA 2 X 10MM TI - [id number]  SCREW ACOSTA 2 X 10MM TI  [employer]  Left 4 Implanted   SCREW ACOSTA 2 X 12MM TI - [id number]  SCREW ACOSTA 2 X 12MM TI  [employer]  Left 1 Implanted   PUTTY ALLOSYNC DBM 2.5ML - [id number]  PUTTY ALLOSYNC DBM 2.5ML [id number] [employer] [id number] Left 1 Implanted       Operative Findings:  - 5th metatarsal mid-shaft comminuted fracture, reduced to near anatomic alignment and fixated with plate/screws.     Complications:   None    Procedure and Technique:      Under mild sedation, the patient was brought into the operating room and placed on the operating room table in the supine position. IV sedation was achieved by anesthesia team and a universal timeout was performed where all parties are in agreement of correct patient, correct procedure and correct site. A pneumatic tourniquet was then placed over the patient's left lower extremity with ample padding. An ankle block was performed consisting of 20 ml of 0.5% Bupivacaine and 1% Lidocaine in a 1:1 mixture. The foot was then prepped and draped in the usual aseptic manner. An esmarch bandage was used to exsangunate the foot and the pneumatic tourniquet was then inflated to 250 mmHg.    Attention was directed to the lateral aspect of the Left foot. Incision was made directly over the 5th metatarsal head and distal shaft with a 15-blade through skin and subcutaneous tissues.  A capsular incision was made with care to retract all vital neurovascular structures. Electrocautery was used as needed. Sharp dissection was extended down to the level of bone. Upon reflecting periosteum, long oblique/comminuted fracture was exposed at the distal 5th metatarsal. Hematoma and debris were evacuated with a currette.  It was then irrigated with saline.  The 5th digit and metatarsal head was then longitudinally distracted and the capital fragment was reduced into anatomic position utilizing a reduction clamp. Proper position was verified utilizing C-arm.  An inter frag screw was then placed perpendicular to the fracture line from proximal lateral to distal medial utilizing proper AO technique.  A 6-hole long plate was then placed against the lateral aspect of the 5th metatarsal surface and held in position temporarily utilizing olive wires.  Proper length and positioning was confirmed on C-arm.  The plate holes were drilled distally and filled with locking screws as described above. Lastly, the remaining holes in the proximal aspect of  "the plate were filled with locking screws. Please refer to implants listed above. Correct position, length, and reduction of deformity or verified on C-arm. The surgical site was irrigated with copious amounts sterile saline.     The periosteal and capsular structures were reapproximated using 3-0 vicryl. Subcutaneous closure was obtained utilizing 4-0 vicryl. Skin edges were reapproximated and closure was obtained utilizing 4-0 nylon. Additional 20ml of Exparel injected locally adjacent to incision. The foot was then cleansed and dried. The incision site was dressed with xeroform, gauze. This was then covered with Kerlix and Smith compression dressing of Webril and ACE bandage.      The tourniquet was deflated at approximately 90 min and normal hyperemic response was noted to all digits. The patient tolerated the procedure and anesthesia well without immediate complications and transferred to PACU with vital signs stable.       Dr. Villanueva was present during the entire procedure and participated in all key aspects.    SIGNATURE: Jimy Cox DPM  DATE: July 9, 2024  TIME: 2:01 PM      Portions of the record may have been created with voice recognition software. Occasional wrong word or \"sound a like\" substitutions may have occurred due to the inherent limitations of voice recognition software. Read the chart carefully and recognize, using context, where substitutions have occurred.            "

## 2024-07-12 ENCOUNTER — TELEPHONE (OUTPATIENT)
Age: 69
End: 2024-07-12

## 2024-07-12 NOTE — TELEPHONE ENCOUNTER
Caller: Patient     Doctor: Rich     Reason for call: Patient asking for a work note for her employer please contact patient, she will also be faxing and or uploading her [facility] paperwork     Please advise     Call back#: [phone]         Fax#: [employer] [facility] [phone]

## 2024-07-12 NOTE — TELEPHONE ENCOUNTER
Caller: Melinda Hickman    Doctor and/or Office: Dr. Villanueva/[facility]#: [phone]    Escalation: Disability forms/sent message through Epic and attached her FMLA-I do not see it here yet but she said she did it this AM. Please call her back and advise as to when this will be completed so she can get paid/also not sure if it has fax number on it to send it but she will call back later to give one after she speaks to her employer.  Thanks

## 2024-07-12 NOTE — TELEPHONE ENCOUNTER
I will contact pt once we receive the forms  10-14 days for forms to be completed once they are received by the office.

## 2024-07-15 ENCOUNTER — TELEPHONE (OUTPATIENT)
Age: 69
End: 2024-07-15

## 2024-07-15 NOTE — TELEPHONE ENCOUNTER
Caller: Melinda Hickman    Doctor and/or Office: Dr. Villanueva/[facility]#: [phone]    Escalation: Care/Needs a letter for her job at [employer] stating what her condition is/current situation and when she may be able to return to work. She said she gave all the info at appt.,(contact name/fax number) but if you want to wait until tomorrow at her appt. To give this to her she is fine with that. Please call back and advise so she knows to ask for this at office tomm. If not written today by Dr. Villanueva. Thanks

## 2024-07-16 ENCOUNTER — OFFICE VISIT (OUTPATIENT)
Dept: PODIATRY | Facility: CLINIC | Age: 69
End: 2024-07-16

## 2024-07-16 VITALS
SYSTOLIC BLOOD PRESSURE: 165 MMHG | WEIGHT: 180 LBS | HEIGHT: 68 IN | DIASTOLIC BLOOD PRESSURE: 95 MMHG | BODY MASS INDEX: 27.28 KG/M2 | HEART RATE: 88 BPM

## 2024-07-16 DIAGNOSIS — S92.352A CLOSED DISPLACED FRACTURE OF FIFTH METATARSAL BONE OF LEFT FOOT, INITIAL ENCOUNTER: Primary | ICD-10-CM

## 2024-07-16 PROCEDURE — 99024 POSTOP FOLLOW-UP VISIT: CPT | Performed by: PODIATRIST

## 2024-07-16 NOTE — PROGRESS NOTES
"          PATIENT:  Melinda Hickman      1955    ASSESSMENT     1. Closed displaced fracture of fifth metatarsal bone of left foot, initial encounter               PLAN  Patient is doing well post-operatively.  Sutures left intact. Incision was cleaned with betadine and DSD applied to be kept C/D/I.  Continue post-op care as instructed.  NWB left foot.   Call if any increase in pain, fevers, calf pain, shortness of breath, or general distress is noted. Patient instructed to go to ER if call is not returned immediately.  RA in 1 week.        HISTORY OF PRESENT ILLNESS  Patient presents for post-op appointment.  Post-op pain is minimal.  The patient is feeling well and in good spirits.  Patient reported no post-op concern.      REVIEW OF SYSTEMS  GENERAL: No fever or chills.    HEART: No chest pain, or palpitation  RESPIRATORY:  No SOB or cough  GI: No Nausea, vomit or diarrhea  NEUROLOGIC: No syncope or acute weakness  MUSCULOSKELETAL: No calf pain or edema.      PHYSICAL EXAMINATION    /95 (BP Location: Left arm, Patient Position: Sitting, Cuff Size: Adult)   Pulse 88   Ht 5' 8\" (1.727 m) Comment: verbal  Wt 81.6 kg (180 lb) Comment: verbal  BMI 27.37 kg/m²     GENERAL  The patient appears in NAD / non-toxic. Afebrile. VSS    VASCULAR EXAM  Pedal pulses and vascular status are intact.  No calf pain or edema bilaterally.  No cyanosis.    DERMATOLOGIC EXAM  Incision is coapted and healing well.  No signs of infection. No active drainage. Normal post-op edema and ecchymosis. No necrosis or dehiscence.    NEUROLOGIC EXAM  AAO X 3.  No focal neurologic deficit.  Neurologic status is intact BLE.    MUSCULOSKELETAL EXAM  Good surgical correction.  Normal post-op findings. ROM intact.  No fluctuation or crepitus.  "

## 2024-07-23 ENCOUNTER — OFFICE VISIT (OUTPATIENT)
Dept: PODIATRY | Facility: CLINIC | Age: 69
End: 2024-07-23

## 2024-07-23 VITALS
SYSTOLIC BLOOD PRESSURE: 173 MMHG | WEIGHT: 180 LBS | HEIGHT: 68 IN | HEART RATE: 80 BPM | BODY MASS INDEX: 27.28 KG/M2 | DIASTOLIC BLOOD PRESSURE: 91 MMHG

## 2024-07-23 DIAGNOSIS — S92.352A CLOSED DISPLACED FRACTURE OF FIFTH METATARSAL BONE OF LEFT FOOT, INITIAL ENCOUNTER: Primary | ICD-10-CM

## 2024-07-23 PROCEDURE — 99024 POSTOP FOLLOW-UP VISIT: CPT | Performed by: PODIATRIST

## 2024-07-23 NOTE — PROGRESS NOTES
"          PATIENT:  Melinda Hickman      1955    ASSESSMENT     1. Closed displaced fracture of fifth metatarsal bone of left foot, initial encounter               PLAN  Patient is doing well post-operatively.  Sutures removed and steri-strips applied.  Instructed skin care and protection.  Continue post-op care as instructed.  NWB left foot.   Call if any increase in pain, fevers, calf pain, shortness of breath, or general distress is noted. Patient instructed to go to ER if call is not returned immediately.  RA in 3 weeks.        HISTORY OF PRESENT ILLNESS  Patient presents for post-op appointment.  Post-op pain is minimal.  The patient is feeling well.  Patient reported no post-op concern.      REVIEW OF SYSTEMS  GENERAL: No fever or chills.    HEART: No chest pain, or palpitation  RESPIRATORY:  No SOB or cough  GI: No Nausea, vomit or diarrhea  NEUROLOGIC: No syncope or acute weakness  MUSCULOSKELETAL: No calf pain or edema.      PHYSICAL EXAMINATION    BP (!) 173/91 (BP Location: Left arm, Patient Position: Sitting, Cuff Size: Adult)   Pulse 80   Ht 5' 8\" (1.727 m) Comment: verbal  Wt 81.6 kg (180 lb) Comment: verbal  BMI 27.37 kg/m²     GENERAL  The patient appears in NAD / non-toxic. Afebrile. VSS    VASCULAR EXAM  Pedal pulses and vascular status are intact.  No calf pain or edema bilaterally.  No cyanosis.    DERMATOLOGIC EXAM  Incision is coapted and healed.  No signs of infection. No drainage.  Decreased post-op edema and ecchymosis. No necrosis or dehiscence.    NEUROLOGIC EXAM  AAO X 3.  No focal neurologic deficit.  Neurologic status is intact BLE.    MUSCULOSKELETAL EXAM  Good surgical correction.  Normal post-op findings. ROM intact.  No fluctuation or crepitus.  "

## 2024-07-24 LAB
DME PARACHUTE DELIVERY DATE ACTUAL: NORMAL
DME PARACHUTE DELIVERY DATE REQUESTED: NORMAL
DME PARACHUTE ITEM DESCRIPTION: NORMAL
DME PARACHUTE ORDER STATUS: NORMAL
DME PARACHUTE SUPPLIER NAME: NORMAL
DME PARACHUTE SUPPLIER PHONE: NORMAL

## 2024-08-13 ENCOUNTER — OFFICE VISIT (OUTPATIENT)
Dept: PODIATRY | Facility: CLINIC | Age: 69
End: 2024-08-13

## 2024-08-13 ENCOUNTER — APPOINTMENT (OUTPATIENT)
Dept: RADIOLOGY | Age: 69
End: 2024-08-13
Payer: COMMERCIAL

## 2024-08-13 VITALS
SYSTOLIC BLOOD PRESSURE: 182 MMHG | HEART RATE: 80 BPM | DIASTOLIC BLOOD PRESSURE: 97 MMHG | WEIGHT: 180 LBS | BODY MASS INDEX: 27.28 KG/M2 | HEIGHT: 68 IN

## 2024-08-13 DIAGNOSIS — S92.352A CLOSED DISPLACED FRACTURE OF FIFTH METATARSAL BONE OF LEFT FOOT, INITIAL ENCOUNTER: ICD-10-CM

## 2024-08-13 DIAGNOSIS — S92.352A CLOSED DISPLACED FRACTURE OF FIFTH METATARSAL BONE OF LEFT FOOT, INITIAL ENCOUNTER: Primary | ICD-10-CM

## 2024-08-13 PROCEDURE — 73630 X-RAY EXAM OF FOOT: CPT

## 2024-08-13 PROCEDURE — 99024 POSTOP FOLLOW-UP VISIT: CPT | Performed by: PODIATRIST

## 2024-08-13 NOTE — PROGRESS NOTES
"          PATIENT:  Melinda Hickman      1955    ASSESSMENT     1. Closed displaced fracture of fifth metatarsal bone of left foot, initial encounter  XR foot 3+ vw left             PLAN  Patient is doing well post-operatively.  Sent her for post-op X-ray.  Discussed patient compliance.  Instructed post-op care. RA in 4 weeks.        HISTORY OF PRESENT ILLNESS  Patient presents for post-op appointment.  She reports no pain.  She has been putting weight on her left foot.  Patient reported no post-op concern.      REVIEW OF SYSTEMS  GENERAL: No fever or chills.    HEART: No chest pain, or palpitation  RESPIRATORY:  No SOB or cough  GI: No Nausea, vomit or diarrhea  NEUROLOGIC: No syncope or acute weakness  MUSCULOSKELETAL: No calf pain or edema.      PHYSICAL EXAMINATION    BP (!) 182/97   Pulse 80   Ht 5' 8\" (1.727 m)   Wt 81.6 kg (180 lb)   BMI 27.37 kg/m²     GENERAL  The patient appears in NAD / non-toxic. Afebrile. VSS    VASCULAR EXAM  Pedal pulses and vascular status are intact.  No calf pain or edema bilaterally.  No cyanosis.    DERMATOLOGIC EXAM  No wound.  No signs of infection. No drainage.  No necrosis or dehiscence.    NEUROLOGIC EXAM  AAO X 3.  No focal neurologic deficit.  Neurologic status is intact BLE.    MUSCULOSKELETAL EXAM  Good surgical correction.  Normal post-op findings. ROM intact.  No fluctuation or crepitus.  Swelling resolving well.    "

## 2024-09-10 ENCOUNTER — OFFICE VISIT (OUTPATIENT)
Dept: PODIATRY | Facility: CLINIC | Age: 69
End: 2024-09-10

## 2024-09-10 VITALS
DIASTOLIC BLOOD PRESSURE: 91 MMHG | HEIGHT: 68 IN | BODY MASS INDEX: 27.28 KG/M2 | HEART RATE: 80 BPM | WEIGHT: 180 LBS | SYSTOLIC BLOOD PRESSURE: 165 MMHG

## 2024-09-10 DIAGNOSIS — S92.352D CLOSED DISPLACED FRACTURE OF FIFTH METATARSAL BONE OF LEFT FOOT WITH ROUTINE HEALING, SUBSEQUENT ENCOUNTER: Primary | ICD-10-CM

## 2024-09-10 PROCEDURE — 99024 POSTOP FOLLOW-UP VISIT: CPT | Performed by: PODIATRIST

## 2024-09-10 NOTE — PROGRESS NOTES
"          PATIENT:  Melinda Hickman      1955    ASSESSMENT     1. Closed displaced fracture of fifth metatarsal bone of left foot with routine healing, subsequent encounter  DXA bone density spine hip and pelvis    XR foot 3+ vw left             PLAN  Patient is doing well post-operatively.  Previous X-ray reviewed / discussed.  Sent her for repeat X-ray before going back to work.  Okay to advance shoes as tolerated.  Discussed patient compliance.  RA in 6 weeks.        HISTORY OF PRESENT ILLNESS  Patient presents for post-op appointment.  She reports no pain.  She presents with regular shoes.  She has been wearing them in the last 1.5 - 2 weeks.        REVIEW OF SYSTEMS  GENERAL: No fever or chills.    HEART: No chest pain, or palpitation  RESPIRATORY:  No SOB or cough  GI: No Nausea, vomit or diarrhea  NEUROLOGIC: No syncope or acute weakness  MUSCULOSKELETAL: No calf pain or edema.      PHYSICAL EXAMINATION    /91   Pulse 80   Ht 5' 8\" (1.727 m)   Wt 81.6 kg (180 lb) Comment: verbal  BMI 27.37 kg/m²     GENERAL  The patient appears in NAD / non-toxic. Afebrile. VSS    VASCULAR EXAM  Pedal pulses and vascular status are intact.  No calf pain or edema bilaterally.  No cyanosis.    DERMATOLOGIC EXAM  No wound.  No signs of infection. No drainage.  No necrosis or dehiscence.    NEUROLOGIC EXAM  AAO X 3.  No focal neurologic deficit.  Neurologic status is intact BLE.    MUSCULOSKELETAL EXAM  Good surgical correction.  Normal post-op findings. ROM intact.  No fluctuation or crepitus.  Normal post-op swelling noted.  No pain on palpation.    "

## 2024-09-11 ENCOUNTER — TELEPHONE (OUTPATIENT)
Age: 69
End: 2024-09-11

## 2024-09-11 ENCOUNTER — OFFICE VISIT (OUTPATIENT)
Dept: INTERNAL MEDICINE CLINIC | Facility: CLINIC | Age: 69
End: 2024-09-11
Payer: COMMERCIAL

## 2024-09-11 VITALS
HEART RATE: 80 BPM | BODY MASS INDEX: 26.67 KG/M2 | HEIGHT: 68 IN | DIASTOLIC BLOOD PRESSURE: 60 MMHG | SYSTOLIC BLOOD PRESSURE: 160 MMHG | WEIGHT: 176 LBS | TEMPERATURE: 98 F | OXYGEN SATURATION: 96 %

## 2024-09-11 DIAGNOSIS — Z76.89 ESTABLISHING CARE WITH NEW DOCTOR, ENCOUNTER FOR: ICD-10-CM

## 2024-09-11 DIAGNOSIS — E55.9 VITAMIN D DEFICIENCY: ICD-10-CM

## 2024-09-11 DIAGNOSIS — R53.81 PHYSICAL DECONDITIONING: ICD-10-CM

## 2024-09-11 DIAGNOSIS — Z13.220 LIPID SCREENING: ICD-10-CM

## 2024-09-11 DIAGNOSIS — Z00.00 ANNUAL PHYSICAL EXAM: ICD-10-CM

## 2024-09-11 DIAGNOSIS — Z23 ENCOUNTER FOR IMMUNIZATION: ICD-10-CM

## 2024-09-11 DIAGNOSIS — H53.9 VISION CHANGES: ICD-10-CM

## 2024-09-11 DIAGNOSIS — Z12.11 SCREENING FOR COLON CANCER: ICD-10-CM

## 2024-09-11 DIAGNOSIS — S22.000D COMPRESSION FRACTURE OF THORACIC VERTEBRA WITH ROUTINE HEALING, UNSPECIFIED THORACIC VERTEBRAL LEVEL, SUBSEQUENT ENCOUNTER: Primary | ICD-10-CM

## 2024-09-11 DIAGNOSIS — I10 PRIMARY HYPERTENSION: ICD-10-CM

## 2024-09-11 DIAGNOSIS — Z12.31 ENCOUNTER FOR SCREENING MAMMOGRAM FOR BREAST CANCER: ICD-10-CM

## 2024-09-11 PROCEDURE — 90677 PCV20 VACCINE IM: CPT

## 2024-09-11 PROCEDURE — 99387 INIT PM E/M NEW PAT 65+ YRS: CPT | Performed by: INTERNAL MEDICINE

## 2024-09-11 PROCEDURE — 90471 IMMUNIZATION ADMIN: CPT

## 2024-09-11 RX ORDER — AMLODIPINE BESYLATE 2.5 MG/1
2.5 TABLET ORAL DAILY
Qty: 100 TABLET | Refills: 0 | Status: SHIPPED | OUTPATIENT
Start: 2024-09-11

## 2024-09-11 NOTE — PROGRESS NOTES
Adult Annual Physical  Name: Melinda Hickman      : 1955      MRN: [id number]  Encounter Provider: Darlene Olmedo MD  Encounter Date: 2024   Encounter department: [facility]IC[facility]    Assessment & Plan  Compression fracture of thoracic vertebra with routine healing, unspecified thoracic vertebral level, subsequent encounter   Asymptomatic, will order DEXA scan        Encounter for screening mammogram for breast cancer    Orders:    Mammo screening bilateral w 3d and cad; Future    Screening for colon cancer    Orders:    Ambulatory Referral to Gastroenterology; Future    Annual physical exam         Physical deconditioning  Recommend graded exercise, good nutrition, and hydration        Lipid screening    Orders:    Lipid panel; Future    Vitamin D deficiency    Orders:    Vitamin D 25 hydroxy; Future    Vision changes    Orders:    Ambulatory Referral to Ophthalmology; Future    Primary hypertension  Persistently elevated SBP, discussed lifestyle modifications/Dash diet. Will start a low-dose amlodipine, follow-up with home BP monitoring   Orders:    amLODIPine (NORVASC) 2.5 mg tablet; Take 1 tablet (2.5 mg total) by mouth daily    Encounter for immunization    Orders:    Pneumococcal Conjugate Vaccine 20-valent (Pcv20)    Establishing care with new doctor, encounter for  Establish care  No access to medical care last several years  Recovering from ORIF of left fifth metatarsal bone, with a bilateral residual edema.           Immunizations and preventive care screenings were discussed with patient today. Appropriate education was printed on patient's after visit summary.    Counseling:  Exercise: the importance of regular exercise/physical activity was discussed. Recommend exercise 3-5 times per week for at least 30 minutes.          History of Present Illness     Adult Annual Physical:  Patient presents for annual physical.     Diet and Physical Activity:  -  Diet/Nutrition: well balanced diet.  - Exercise: no formal exercise.    General Health:  - Sleep: sleeps well.  - Hearing: normal hearing right ear and normal hearing left ear.  - Vision: no vision problems.  - Dental: no dental visits for > 1 year.    /GYN Health:  - Follows with GYN: no.   - Menopause: postmenopausal.   - History of STDs: no  - Contraception: menopause.      Advanced Care Planning:  - Has an advanced directive?: no    - Has a durable medical POA?: no    - ACP document given to patient?: yes      Review of Systems   Constitutional:  Negative for appetite change, chills, diaphoresis, fatigue, fever and unexpected weight change.   Respiratory:  Negative for apnea, cough, choking, chest tightness, shortness of breath, wheezing and stridor.    Cardiovascular:  Negative for chest pain, palpitations and leg swelling.   Gastrointestinal:  Negative for abdominal distention, abdominal pain, anal bleeding, blood in stool, constipation, diarrhea, nausea and vomiting.   Genitourinary:  Negative for decreased urine volume, difficulty urinating, frequency and urgency.   Musculoskeletal:  Negative for arthralgias, back pain and myalgias.   Neurological:  Negative for dizziness, light-headedness, numbness and headaches.     Current Outpatient Medications on File Prior to Visit   Medication Sig Dispense Refill    multivitamin (THERAGRAN) TABS Take 1 tablet by mouth daily      aspirin 81 mg chewable tablet Chew 1 tablet (81 mg total) daily (Patient not taking: Reported on 7/16/2024) 30 tablet 0    oxyCODONE-acetaminophen (Percocet) 5-325 mg per tablet Take 1 tablet by mouth every 4 (four) hours as needed for moderate pain for up to 20 doses Max Daily Amount: 6 tablets (Patient not taking: Reported on 7/16/2024) 20 tablet 0     No current facility-administered medications on file prior to visit.        Objective     /60 (BP Location: Left arm, Patient Position: Sitting, Cuff Size: Standard)   Pulse 80    "Temp 98 °F (36.7 °C) (Temporal)   Ht 5' 8\" (1.727 m)   Wt 79.8 kg (176 lb)   SpO2 96%   BMI 26.76 kg/m²     Physical Exam  Constitutional:       General: She is not in acute distress.     Appearance: Normal appearance. She is normal weight. She is not ill-appearing, toxic-appearing or diaphoretic.   Cardiovascular:      Rate and Rhythm: Normal rate and regular rhythm.      Pulses: Normal pulses.      Heart sounds: Normal heart sounds. No murmur heard.     No gallop.   Pulmonary:      Effort: Pulmonary effort is normal. No respiratory distress.      Breath sounds: Normal breath sounds. No stridor. No wheezing, rhonchi or rales.   Chest:      Chest wall: No tenderness.   Abdominal:      General: There is no distension.      Palpations: Abdomen is soft.   Musculoskeletal:      Right lower leg: Edema present.      Left lower leg: Edema present.   Skin:     General: Skin is warm and dry.   Neurological:      Mental Status: She is alert and oriented to person, place, and time.         "

## 2024-09-11 NOTE — TELEPHONE ENCOUNTER
Caller: Melinda Hickman    Doctor: Rich / Gibson Ferguson    Reason for call: Melinda needs a note saying she can return to work as of 9/19.  Can it be faxed to [employer]?  Thank you.     Call back#: Fax: [phone]

## 2024-09-11 NOTE — PATIENT INSTRUCTIONS
"Patient Education     Routine physical for adults   The Basics   Written by the doctors and editors at [facility]   What is a physical? -- A physical is a routine visit, or \"check-up,\" with your doctor. You might also hear it called a \"wellness visit\" or \"preventive visit.\"  During each visit, the doctor will:   Ask about your physical and mental health   Ask about your habits, behaviors, and lifestyle   Do an exam   Give you vaccines if needed   Talk to you about any medicines you take   Give advice about your health   Answer your questions  Getting regular check-ups is an important part of taking care of your health. It can help your doctor find and treat any problems you have. But it's also important for preventing health problems.  A routine physical is different from a \"sick visit.\" A sick visit is when you see a doctor because of a health concern or problem. Since physicals are scheduled ahead of time, you can think about what you want to ask the doctor.  How often should I get a physical? -- It depends on your age and health. In general, for people age 21 years and older:   If you are younger than 50 years, you might be able to get a physical every 3 years.   If you are 50 years or older, your doctor might recommend a physical every year.  If you have an ongoing health condition, like diabetes or high blood pressure, your doctor will probably want to see you more often.  What happens during a physical? -- In general, each visit will include:   Physical exam - The doctor or nurse will check your height, weight, heart rate, and blood pressure. They will also look at your eyes and ears. They will ask about how you are feeling and whether you have any symptoms that bother you.   Medicines - It's a good idea to bring a list of all the medicines you take to each doctor visit. Your doctor will talk to you about your medicines and answer any questions. Tell them if you are having any side effects that bother you. You " "should also tell them if you are having trouble paying for any of your medicines.   Habits and behaviors - This includes:   Your diet   Your exercise habits   Whether you smoke, drink alcohol, or use drugs   Whether you are sexually active   Whether you feel safe at home  Your doctor will talk to you about things you can do to improve your health and lower your risk of health problems. They will also offer help and support. For example, if you want to quit smoking, they can give you advice and might prescribe medicines. If you want to improve your diet or get more physical activity, they can help you with this, too.   Lab tests, if needed - The tests you get will depend on your age and situation. For example, your doctor might want to check your:   Cholesterol   Blood sugar   Iron level   Vaccines - The recommended vaccines will depend on your age, health, and what vaccines you already had. Vaccines are very important because they can prevent certain serious or deadly infections.   Discussion of screening - \"Screening\" means checking for diseases or other health problems before they cause symptoms. Your doctor can recommend screening based on your age, risk, and preferences. This might include tests to check for:   Cancer, such as breast, prostate, cervical, ovarian, colorectal, prostate, lung, or skin cancer   Sexually transmitted infections, such as chlamydia and gonorrhea   Mental health conditions like depression and anxiety  Your doctor will talk to you about the different types of screening tests. They can help you decide which screenings to have. They can also explain what the results might mean.   Answering questions - The physical is a good time to ask the doctor or nurse questions about your health. If needed, they can refer you to other doctors or specialists, too.  Adults older than 65 years often need other care, too. As you get older, your doctor will talk to you about:   How to prevent falling at " home   Hearing or vision tests   Memory testing   How to take your medicines safely   Making sure that you have the help and support you need at home  All topics are updated as new evidence becomes available and our peer review process is complete.  This topic retrieved from [employer] on: May 02, 2024.  Topic 846279 Version 1.0  Release: 32.4.3 - C32.122  © 2024 UpToDate[employer] Inc[employer] and/or its affiliates. All rights reserved.  Consumer Information Use and Disclaimer   Disclaimer: This generalized information is a limited summary of diagnosis, treatment, and/or medication information. It is not meant to be comprehensive and should be used as a tool to help the user understand and/or assess potential diagnostic and treatment options. It does NOT include all information about conditions, treatments, medications, side effects, or risks that may apply to a specific patient. It is not intended to be medical advice or a substitute for the medical advice, diagnosis, or treatment of a health care provider based on the health care provider's examination and assessment of a patient's specific and unique circumstances. Patients must speak with a health care provider for complete information about their health, medical questions, and treatment options, including any risks or benefits regarding use of medications. This information does not endorse any treatments or medications as safe, effective, or approved for treating a specific patient. Up[employer]Date, Inc[employer] and its affiliates disclaim any warranty or liability relating to this information or the use thereof.The use of this information is governed by the Terms of Use, available at https://www.w[employer]tersAquaBlokuwer.com/en/know/clinical-effectiveness-terms. 2024© UpToDate, Inc. and its affiliates and/or licensors. All rights reserved.  Copyright   © 2024 Up[employer]Date, Inc. and/or its affiliates. All rights reserved.

## 2024-09-25 ENCOUNTER — HOSPITAL ENCOUNTER (OUTPATIENT)
Dept: RADIOLOGY | Age: 69
Discharge: HOME | End: 2024-09-25
Payer: COMMERCIAL

## 2024-09-25 ENCOUNTER — APPOINTMENT (OUTPATIENT)
Dept: RADIOLOGY | Age: 69
End: 2024-09-25
Payer: COMMERCIAL

## 2024-09-25 DIAGNOSIS — S92.352D CLOSED DISPLACED FRACTURE OF FIFTH METATARSAL BONE OF LEFT FOOT WITH ROUTINE HEALING, SUBSEQUENT ENCOUNTER: ICD-10-CM

## 2024-09-25 PROCEDURE — 73630 X-RAY EXAM OF FOOT: CPT

## 2024-09-25 PROCEDURE — 77080 DXA BONE DENSITY AXIAL: CPT

## 2024-10-22 ENCOUNTER — OFFICE VISIT (OUTPATIENT)
Dept: PODIATRY | Facility: CLINIC | Age: 69
End: 2024-10-22
Payer: COMMERCIAL

## 2024-10-22 VITALS
DIASTOLIC BLOOD PRESSURE: 82 MMHG | HEIGHT: 68 IN | SYSTOLIC BLOOD PRESSURE: 120 MMHG | WEIGHT: 176 LBS | BODY MASS INDEX: 26.67 KG/M2

## 2024-10-22 DIAGNOSIS — S92.352D CLOSED DISPLACED FRACTURE OF FIFTH METATARSAL BONE OF LEFT FOOT WITH ROUTINE HEALING, SUBSEQUENT ENCOUNTER: Primary | ICD-10-CM

## 2024-10-22 PROCEDURE — 99212 OFFICE O/P EST SF 10 MIN: CPT | Performed by: PODIATRIST

## 2024-10-22 NOTE — PROGRESS NOTES
"          PATIENT:  Melinda Hickman      1955    ASSESSMENT     1. Closed displaced fracture of fifth metatarsal bone of left foot with routine healing, subsequent encounter                 PLAN  X-ray and bone density test reviewed / discussed.  Advance activity as tolerated.  Instructed supportive care.  D/C her from our care.      HISTORY OF PRESENT ILLNESS  Patient presents for follow-up.  She feels well with no pain.  Tolerates regular shoes and work well.      REVIEW OF SYSTEMS  GENERAL: No fever or chills.    HEART: No chest pain, or palpitation  RESPIRATORY:  No SOB or cough  GI: No Nausea, vomit or diarrhea  NEUROLOGIC: No syncope or acute weakness    PHYSICAL EXAMINATION    /82   Ht 5' 8\" (1.727 m)   Wt 79.8 kg (176 lb)   BMI 26.76 kg/m²     GENERAL  The patient appears in NAD / non-toxic. Afebrile. VSS    VASCULAR EXAM  Pedal pulses and vascular status are intact.  No calf pain or edema bilaterally.  No cyanosis.    DERMATOLOGIC EXAM  No wound.  No signs of infection. No drainage.  No necrosis or dehiscence.    NEUROLOGIC EXAM  AAO X 3.  No focal neurologic deficit.  Neurologic status is intact BLE.    MUSCULOSKELETAL EXAM  ROM intact.  No fluctuation or crepitus.  No pain on palpation.  Minimal swelling.    "

## 2024-10-25 ENCOUNTER — RA CDI HCC (OUTPATIENT)
Dept: OTHER | Facility: HOSPITAL | Age: 69
End: 2024-10-25

## 2024-12-11 DIAGNOSIS — I10 PRIMARY HYPERTENSION: ICD-10-CM

## 2024-12-13 RX ORDER — AMLODIPINE BESYLATE 2.5 MG/1
2.5 TABLET ORAL DAILY
Qty: 100 TABLET | Refills: 0 | Status: SHIPPED | OUTPATIENT
Start: 2024-12-13

## 2024-12-18 ENCOUNTER — VBI (OUTPATIENT)
Dept: ADMINISTRATIVE | Facility: OTHER | Age: 69
End: 2024-12-18

## 2024-12-18 NOTE — TELEPHONE ENCOUNTER
12/18/24 7:53 AM     Chart reviewed for [facility]: Colonoscopy and Mammogram ; nothing is submitted to the patient's insurance at this time.     Kimberly Andrea MA   [facility]

## 2025-03-21 DIAGNOSIS — I10 PRIMARY HYPERTENSION: ICD-10-CM

## 2025-03-21 RX ORDER — AMLODIPINE BESYLATE 2.5 MG/1
2.5 TABLET ORAL DAILY
Qty: 100 TABLET | Refills: 1 | Status: SHIPPED | OUTPATIENT
Start: 2025-03-21

## 2025-03-24 ENCOUNTER — TELEPHONE (OUTPATIENT)
Age: 70
End: 2025-03-24

## 2025-03-30 NOTE — TELEPHONE ENCOUNTER
03/29/25 11:33 PM        The office's request has been received, reviewed, and the patient chart updated. The PCP has successfully been removed with a patient attribution note. This message will now be completed.        Thank you  Radha Clarke

## (undated) DEVICE — BIT DRILL 1.5MM

## (undated) DEVICE — STOCKINETTE REGULAR

## (undated) DEVICE — IMPLANTABLE DEVICE
Type: IMPLANTABLE DEVICE | Site: FOOT | Status: NON-FUNCTIONAL
Removed: 2024-07-09

## (undated) DEVICE — SPONGE STICK WITH PVP-I: Brand: KENDALL

## (undated) DEVICE — GLOVE SRG BIOGEL 7.5

## (undated) DEVICE — PADDING CAST 4 IN  COTTON STRL

## (undated) DEVICE — Device

## (undated) DEVICE — CHLORAPREP HI-LITE 26ML ORANGE

## (undated) DEVICE — BETHLEHEM UNIVERSAL  MIONR EXT: Brand: CARDINAL HEALTH

## (undated) DEVICE — GLOVE INDICATOR PI UNDERGLOVE SZ 7.5 BLUE

## (undated) DEVICE — DRILL BIT 2.4MM

## (undated) DEVICE — OCCLUSIVE GAUZE STRIP,3% BISMUTH TRIBROMOPHENATE IN PETROLATUM BLEND: Brand: XEROFORM

## (undated) DEVICE — C-WIRE PAK DOUBLE ENDED ORTHOPAEDIC WIRE, SPADE, .045" (1.14 MM)
Type: IMPLANTABLE DEVICE | Status: NON-FUNCTIONAL
Brand: C-WIRE

## (undated) DEVICE — CURITY STRETCH BANDAGE: Brand: CURITY

## (undated) DEVICE — SKN PRP WNG SPNGE PVP SCRB STR: Brand: MEDLINE INDUSTRIES, INC.

## (undated) DEVICE — BB-TAK SMALL

## (undated) DEVICE — MEDI-VAC YANK SUCT HNDL W/TPRD BULBOUS TIP: Brand: CARDINAL HEALTH

## (undated) DEVICE — TUBING SUCTION 5MM X 12 FT

## (undated) DEVICE — DRAPE C-ARM X-RAY